# Patient Record
Sex: MALE | Race: ASIAN | Employment: FULL TIME | URBAN - METROPOLITAN AREA
[De-identification: names, ages, dates, MRNs, and addresses within clinical notes are randomized per-mention and may not be internally consistent; named-entity substitution may affect disease eponyms.]

---

## 2018-03-31 ENCOUNTER — HOSPITAL ENCOUNTER (EMERGENCY)
Age: 28
Discharge: HOME | End: 2018-03-31
Attending: EMERGENCY MEDICINE
Payer: COMMERCIAL

## 2018-03-31 VITALS
WEIGHT: 140 LBS | RESPIRATION RATE: 20 BRPM | SYSTOLIC BLOOD PRESSURE: 149 MMHG | BODY MASS INDEX: 22.5 KG/M2 | HEART RATE: 112 BPM | HEIGHT: 66 IN | OXYGEN SATURATION: 99 % | DIASTOLIC BLOOD PRESSURE: 88 MMHG

## 2018-03-31 DIAGNOSIS — R17 JAUNDICE: Primary | ICD-10-CM

## 2018-03-31 PROCEDURE — 36415 COLL VENOUS BLD VENIPUNCTURE: CPT

## 2018-03-31 PROCEDURE — 83690 ASSAY OF LIPASE: CPT | Performed by: EMERGENCY MEDICINE

## 2018-03-31 PROCEDURE — 80074 ACUTE HEPATITIS PANEL: CPT | Performed by: EMERGENCY MEDICINE

## 2018-03-31 PROCEDURE — 85025 COMPLETE CBC W/AUTO DIFF WBC: CPT | Performed by: EMERGENCY MEDICINE

## 2018-03-31 PROCEDURE — 80053 COMPREHEN METABOLIC PANEL: CPT | Performed by: EMERGENCY MEDICINE

## 2018-03-31 PROCEDURE — 86403 PARTICLE AGGLUT ANTBDY SCRN: CPT | Performed by: EMERGENCY MEDICINE

## 2018-03-31 PROCEDURE — 81003 URINALYSIS AUTO W/O SCOPE: CPT | Performed by: EMERGENCY MEDICINE

## 2018-03-31 PROCEDURE — 99283 EMERGENCY DEPT VISIT LOW MDM: CPT

## 2018-04-01 NOTE — ED PROVIDER NOTES
Patient Seen in: [facility] Emergency Department In [place]    History   Patient presents with:  Jaundice (gastrointestinal, hematologic)    Stated Complaint: eyes jaundiced    HPI    Patient noticed jaundiced eyes developing over the past week.   Urine ha Erosive esophagitis, gastritis. : UPPER GI ENDOSCOPY PERFORMED      Comment: H pylori negative        Smoking status: Current Some Day Smoker                                                    Packs/day: 0.00      Years: 0.00         Types: Cigarettes     L components within normal limits   LIPASE - Normal   MONONUCLEOSIS, QUAL - Normal   CBC WITH DIFFERENTIAL WITH PLATELET    Narrative: The following orders were created for panel order CBC WITH DIFFERENTIAL WITH PLATELET.   Procedure

## 2019-05-26 ENCOUNTER — APPOINTMENT (OUTPATIENT)
Dept: CT IMAGING | Facility: HOSPITAL | Age: 29
End: 2019-05-26
Attending: EMERGENCY MEDICINE
Payer: COMMERCIAL

## 2019-05-26 PROCEDURE — 70450 CT HEAD/BRAIN W/O DYE: CPT | Performed by: EMERGENCY MEDICINE

## 2019-05-26 NOTE — ED INITIAL ASSESSMENT (HPI)
Pt arrives after \"over doing drinking and taking benzos\" denies SI/HI.  Pt states he was \"trying to have a good time\"

## 2019-05-26 NOTE — ED NOTES
Food tray ordered - cheeseburger with cheddar, french fries, chicken noodle soup, lemon lime pop, water, and cheesecake

## 2019-05-26 NOTE — ED PROVIDER NOTES
Patient Seen in: [facility] Emergency Department    History   Patient presents with:  Eval-P (psychiatric)    Stated Complaint: Eval P, etoh, took xanax 4-5 pills, fall, abrasion to face    HPI    Bettyjane Dakin is a 64-year-old gentleman presenting to the Comment:  quit 8/27/13    Drug use: No      Review of Systems    Positive for stated complaint: Eval P, etoh, took xanax 4-5 pills, fall, abrasion to face  Other systems are as noted in HPI. Constitutional and vital signs reviewed.       All other systems normal limits   CBC WITH DIFFERENTIAL WITH PLATELET    Narrative: The following orders were created for panel order CBC WITH DIFFERENTIAL WITH PLATELET.   Procedure                               Abnormality         Status                     ---------

## 2019-05-26 NOTE — ED NOTES
PT undressed into hospital gown and socks. All belongings locked and to security. Mom and a second family member at bedside. Pt repeatedly asking for pain meds.  Explained that pt is intoxicated and has possible poly substance use so he cannot receive pain

## 2019-05-27 NOTE — BH LEVEL OF CARE ASSESSMENT
Level of Care Assessment Note    General Questions  Why are you here?: Pt is a 30 yr old male who arrived to the ER via his mom due to drinking and over using his prescription Xanax. Per ER RN note, pt states he was \"trying to have a good time. \" Pt's BAL wished you were dead or wished you could go to sleep and not wake up? (past 30 days): No  2. Have you actually had any thoughts of killing yourself? (past 30 days): No  6.  Have you ever done anything, started to do anything, or prepared to do anything to e Sleep Aids: Per Medication List in EPIC, pt is prescribed Xanax for sleep  Appetite Symptoms: (ness; pt refused assessment)  Unplanned Weight Loss: (ness; pt refused assessment)  Unplanned Weight Gain: (ness; pt refused assessment)  History of Eating Disorder ever been? : (ness; pt refused assessment)                   Support for Recovery  Is your living environment a supportive place for recovery?: (ness; pt refused assessment)  Describe: ness; pt refused assessment     Withdrawal Symptoms  History of Withdrawal laying on hospital bed)  Rate of Movement: Normal  Mood and Affect  Mood or Feelings: Calm  Appropriateness of Affect: Congruent to mood; Appropriate to situation  Range of Affect: Normal  Stability of Affect: Stable  Attitude toward staff: Pleasant  Speech or abuse; Change in treatment  Protective Factors: pt denies SI    Motivational Stage of Change  Motivational Stage of Change: Pre-Contemplative    Level of Care Recommendations  Consulted with: ER MD, Dr. Clive Phelps, and [facility] Charge, Agustin, were notified that

## 2019-05-27 NOTE — ED NOTES
Pt ambulated around c pod nursing station without difficulty. Pt denies pain/weakness/adi/nausea. Mom at bs.

## 2019-05-27 NOTE — ED NOTES
Pt remains resting comfortably; breathing remains unlabored. Pt again reminded urine spec ordered. Urinal handed to pt.  Mom remains at bs,

## 2019-05-28 PROBLEM — F10.20 ALCOHOL USE DISORDER, SEVERE, DEPENDENCE (HCC): Status: ACTIVE | Noted: 2019-05-28

## 2019-07-03 ENCOUNTER — HOSPITAL (OUTPATIENT)
Dept: OTHER | Age: 29
End: 2019-07-03

## 2019-07-08 ENCOUNTER — APPOINTMENT (OUTPATIENT)
Dept: CT IMAGING | Facility: HOSPITAL | Age: 29
End: 2019-07-08
Attending: EMERGENCY MEDICINE
Payer: COMMERCIAL

## 2019-07-08 ENCOUNTER — HOSPITAL ENCOUNTER (EMERGENCY)
Facility: HOSPITAL | Age: 29
Discharge: HOME | End: 2019-07-08
Attending: EMERGENCY MEDICINE
Payer: COMMERCIAL

## 2019-07-08 VITALS
OXYGEN SATURATION: 98 % | RESPIRATION RATE: 18 BRPM | DIASTOLIC BLOOD PRESSURE: 80 MMHG | HEART RATE: 110 BPM | TEMPERATURE: 98 F | SYSTOLIC BLOOD PRESSURE: 113 MMHG

## 2019-07-08 DIAGNOSIS — D72.829 LEUKOCYTOSIS, UNSPECIFIED TYPE: ICD-10-CM

## 2019-07-08 DIAGNOSIS — K52.9 GASTROENTERITIS: Primary | ICD-10-CM

## 2019-07-08 LAB
ALBUMIN SERPL-MCNC: 4.9 G/DL (ref 3.4–5)
ALBUMIN/GLOB SERPL: 0.9 {RATIO} (ref 1–2)
ALP LIVER SERPL-CCNC: 121 U/L (ref 45–117)
ALT SERPL-CCNC: 91 U/L (ref 16–61)
ANION GAP SERPL CALC-SCNC: 8 MMOL/L (ref 0–18)
AST SERPL-CCNC: 53 U/L (ref 15–37)
BASOPHILS # BLD AUTO: 0.04 X10(3) UL (ref 0–0.2)
BASOPHILS NFR BLD AUTO: 0.2 %
BILIRUB SERPL-MCNC: 0.7 MG/DL (ref 0.1–2)
BILIRUB UR QL STRIP.AUTO: NEGATIVE
BUN BLD-MCNC: 19 MG/DL (ref 7–18)
BUN/CREAT SERPL: 12.6 (ref 10–20)
C DIFF TOX B STL QL: NEGATIVE
CALCIUM BLD-MCNC: 10.7 MG/DL (ref 8.5–10.1)
CHLORIDE SERPL-SCNC: 103 MMOL/L (ref 98–112)
CLARITY UR REFRACT.AUTO: CLEAR
CO2 SERPL-SCNC: 24 MMOL/L (ref 21–32)
COLOR UR AUTO: YELLOW
CREAT BLD-MCNC: 1.51 MG/DL (ref 0.7–1.3)
CRYPTOSP AG STL QL IA: NEGATIVE
DEPRECATED RDW RBC AUTO: 41.5 FL (ref 35.1–46.3)
EOSINOPHIL # BLD AUTO: 0.3 X10(3) UL (ref 0–0.7)
EOSINOPHIL NFR BLD AUTO: 1.6 %
ERYTHROCYTE [DISTWIDTH] IN BLOOD BY AUTOMATED COUNT: 14.7 % (ref 11–15)
G LAMBLIA AG STL QL IA: NEGATIVE
GLOBULIN PLAS-MCNC: 5.4 G/DL (ref 2.8–4.4)
GLUCOSE BLD-MCNC: 110 MG/DL (ref 70–99)
GLUCOSE UR STRIP.AUTO-MCNC: NEGATIVE MG/DL
HCT VFR BLD AUTO: 54.9 % (ref 39–53)
HGB BLD-MCNC: 17.8 G/DL (ref 13–17.5)
HYALINE CASTS #/AREA URNS AUTO: PRESENT /LPF
IMM GRANULOCYTES # BLD AUTO: 0.1 X10(3) UL (ref 0–1)
IMM GRANULOCYTES NFR BLD: 0.5 %
KETONES UR STRIP.AUTO-MCNC: NEGATIVE MG/DL
LEUKOCYTE ESTERASE UR QL STRIP.AUTO: NEGATIVE
LIPASE SERPL-CCNC: 174 U/L (ref 73–393)
LIPASE SERPL-CCNC: 180 U/L (ref 73–393)
LYMPHOCYTES # BLD AUTO: 1.54 X10(3) UL (ref 1–4)
LYMPHOCYTES NFR BLD AUTO: 8.1 %
M PROTEIN MFR SERPL ELPH: 10.3 G/DL (ref 6.4–8.2)
MCH RBC QN AUTO: 26.2 PG (ref 26–34)
MCHC RBC AUTO-ENTMCNC: 32.4 G/DL (ref 31–37)
MCV RBC AUTO: 80.7 FL (ref 80–100)
MONOCYTES # BLD AUTO: 1.34 X10(3) UL (ref 0.1–1)
MONOCYTES NFR BLD AUTO: 7.1 %
NEUTROPHILS # BLD AUTO: 15.67 X10 (3) UL (ref 1.5–7.7)
NEUTROPHILS # BLD AUTO: 15.67 X10(3) UL (ref 1.5–7.7)
NEUTROPHILS NFR BLD AUTO: 82.5 %
NITRITE UR QL STRIP.AUTO: NEGATIVE
OSMOLALITY SERPL CALC.SUM OF ELEC: 283 MOSM/KG (ref 275–295)
PH UR STRIP.AUTO: 5 [PH] (ref 4.5–8)
PLATELET # BLD AUTO: 209 10(3)UL (ref 150–450)
POTASSIUM SERPL-SCNC: 4.4 MMOL/L (ref 3.5–5.1)
PROT UR STRIP.AUTO-MCNC: NEGATIVE MG/DL
RBC # BLD AUTO: 6.8 X10(6)UL (ref 4.3–5.7)
SODIUM SERPL-SCNC: 135 MMOL/L (ref 136–145)
SP GR UR STRIP.AUTO: 1.03 (ref 1–1.03)
UROBILINOGEN UR STRIP.AUTO-MCNC: <2 MG/DL
WBC # BLD AUTO: 19 X10(3) UL (ref 4–11)

## 2019-07-08 PROCEDURE — 87329 GIARDIA AG IA: CPT | Performed by: EMERGENCY MEDICINE

## 2019-07-08 PROCEDURE — 87427 SHIGA-LIKE TOXIN AG IA: CPT | Performed by: EMERGENCY MEDICINE

## 2019-07-08 PROCEDURE — 85025 COMPLETE CBC W/AUTO DIFF WBC: CPT | Performed by: EMERGENCY MEDICINE

## 2019-07-08 PROCEDURE — 83690 ASSAY OF LIPASE: CPT | Performed by: EMERGENCY MEDICINE

## 2019-07-08 PROCEDURE — 87272 CRYPTOSPORIDIUM AG IF: CPT | Performed by: EMERGENCY MEDICINE

## 2019-07-08 PROCEDURE — 87209 SMEAR COMPLEX STAIN: CPT | Performed by: EMERGENCY MEDICINE

## 2019-07-08 PROCEDURE — 96361 HYDRATE IV INFUSION ADD-ON: CPT

## 2019-07-08 PROCEDURE — 74177 CT ABD & PELVIS W/CONTRAST: CPT | Performed by: EMERGENCY MEDICINE

## 2019-07-08 PROCEDURE — 87046 STOOL CULTR AEROBIC BACT EA: CPT | Performed by: EMERGENCY MEDICINE

## 2019-07-08 PROCEDURE — 81001 URINALYSIS AUTO W/SCOPE: CPT | Performed by: EMERGENCY MEDICINE

## 2019-07-08 PROCEDURE — 87177 OVA AND PARASITES SMEARS: CPT | Performed by: EMERGENCY MEDICINE

## 2019-07-08 PROCEDURE — 87045 FECES CULTURE AEROBIC BACT: CPT | Performed by: EMERGENCY MEDICINE

## 2019-07-08 PROCEDURE — 87493 C DIFF AMPLIFIED PROBE: CPT | Performed by: EMERGENCY MEDICINE

## 2019-07-08 PROCEDURE — 99285 EMERGENCY DEPT VISIT HI MDM: CPT

## 2019-07-08 PROCEDURE — 96374 THER/PROPH/DIAG INJ IV PUSH: CPT

## 2019-07-08 PROCEDURE — 96376 TX/PRO/DX INJ SAME DRUG ADON: CPT

## 2019-07-08 PROCEDURE — 85025 COMPLETE CBC W/AUTO DIFF WBC: CPT

## 2019-07-08 PROCEDURE — 80053 COMPREHEN METABOLIC PANEL: CPT | Performed by: EMERGENCY MEDICINE

## 2019-07-08 PROCEDURE — 99284 EMERGENCY DEPT VISIT MOD MDM: CPT

## 2019-07-08 PROCEDURE — 82272 OCCULT BLD FECES 1-3 TESTS: CPT | Performed by: EMERGENCY MEDICINE

## 2019-07-08 PROCEDURE — 96375 TX/PRO/DX INJ NEW DRUG ADDON: CPT

## 2019-07-08 RX ORDER — HYDROMORPHONE HYDROCHLORIDE 1 MG/ML
0.5 INJECTION, SOLUTION INTRAMUSCULAR; INTRAVENOUS; SUBCUTANEOUS ONCE
Status: COMPLETED | OUTPATIENT
Start: 2019-07-08 | End: 2019-07-08

## 2019-07-08 RX ORDER — LIDOCAINE HYDROCHLORIDE 20 MG/ML
10 SOLUTION OROPHARYNGEAL ONCE
Status: COMPLETED | OUTPATIENT
Start: 2019-07-08 | End: 2019-07-08

## 2019-07-08 RX ORDER — ESCITALOPRAM OXALATE 20 MG/1
20 TABLET ORAL DAILY
COMMUNITY
End: 2019-10-02

## 2019-07-08 RX ORDER — MAGNESIUM HYDROXIDE/ALUMINUM HYDROXICE/SIMETHICONE 120; 1200; 1200 MG/30ML; MG/30ML; MG/30ML
30 SUSPENSION ORAL ONCE
Status: DISCONTINUED | OUTPATIENT
Start: 2019-07-08 | End: 2019-07-08

## 2019-07-08 RX ORDER — THIAMINE MONONITRATE (VIT B1) 100 MG
100 TABLET ORAL DAILY
COMMUNITY
End: 2019-10-02

## 2019-07-08 RX ORDER — QUETIAPINE 50 MG/1
100 TABLET, FILM COATED ORAL NIGHTLY PRN
COMMUNITY
End: 2019-12-17 | Stop reason: ALTCHOICE

## 2019-07-08 RX ORDER — LORAZEPAM 2 MG/ML
1 INJECTION INTRAMUSCULAR ONCE
Status: COMPLETED | OUTPATIENT
Start: 2019-07-08 | End: 2019-07-08

## 2019-07-08 RX ORDER — CYCLOBENZAPRINE HCL 10 MG
10 TABLET ORAL 3 TIMES DAILY PRN
COMMUNITY
End: 2019-08-14 | Stop reason: ALTCHOICE

## 2019-07-08 RX ORDER — QUETIAPINE 50 MG/1
50 TABLET, FILM COATED ORAL 2 TIMES DAILY
COMMUNITY
End: 2019-12-17 | Stop reason: ALTCHOICE

## 2019-07-08 RX ORDER — ONDANSETRON 2 MG/ML
4 INJECTION INTRAMUSCULAR; INTRAVENOUS ONCE
Status: COMPLETED | OUTPATIENT
Start: 2019-07-08 | End: 2019-07-08

## 2019-07-08 RX ORDER — SODIUM CHLORIDE 9 MG/ML
INJECTION, SOLUTION INTRAVENOUS CONTINUOUS
Status: DISCONTINUED | OUTPATIENT
Start: 2019-07-08 | End: 2019-07-08

## 2019-07-08 RX ORDER — HYDROMORPHONE HYDROCHLORIDE 1 MG/ML
1 INJECTION, SOLUTION INTRAMUSCULAR; INTRAVENOUS; SUBCUTANEOUS ONCE
Status: COMPLETED | OUTPATIENT
Start: 2019-07-08 | End: 2019-07-08

## 2019-07-08 RX ORDER — PANTOPRAZOLE SODIUM 40 MG/1
40 TABLET, DELAYED RELEASE ORAL DAILY
Qty: 30 TABLET | Refills: 0 | Status: SHIPPED | OUTPATIENT
Start: 2019-07-08 | End: 2019-07-31

## 2019-07-08 RX ORDER — MAGNESIUM HYDROXIDE/ALUMINUM HYDROXICE/SIMETHICONE 120; 1200; 1200 MG/30ML; MG/30ML; MG/30ML
30 SUSPENSION ORAL ONCE
Status: COMPLETED | OUTPATIENT
Start: 2019-07-08 | End: 2019-07-08

## 2019-07-08 NOTE — ED INITIAL ASSESSMENT (HPI)
Patient c/o waking up with vomiting and now with severe upper mid-abdominal pain. He states he has diarrhea, vomiting and pain worsening since this morning. Denies fevers or urinary symptoms.

## 2019-07-09 ENCOUNTER — HOSPITAL ENCOUNTER (EMERGENCY)
Facility: HOSPITAL | Age: 29
Discharge: HOME | End: 2019-07-09
Attending: EMERGENCY MEDICINE
Payer: COMMERCIAL

## 2019-07-09 VITALS
WEIGHT: 151.88 LBS | SYSTOLIC BLOOD PRESSURE: 108 MMHG | DIASTOLIC BLOOD PRESSURE: 67 MMHG | HEIGHT: 66 IN | HEART RATE: 108 BPM | RESPIRATION RATE: 16 BRPM | BODY MASS INDEX: 24.41 KG/M2 | OXYGEN SATURATION: 99 % | TEMPERATURE: 97 F

## 2019-07-09 DIAGNOSIS — R19.7 DIARRHEA, UNSPECIFIED TYPE: Primary | ICD-10-CM

## 2019-07-09 LAB
ALBUMIN SERPL-MCNC: 3.9 G/DL (ref 3.4–5)
ALBUMIN/GLOB SERPL: 0.8 {RATIO} (ref 1–2)
ALP LIVER SERPL-CCNC: 88 U/L (ref 45–117)
ALT SERPL-CCNC: 65 U/L (ref 16–61)
ANION GAP SERPL CALC-SCNC: 6 MMOL/L (ref 0–18)
AST SERPL-CCNC: 27 U/L (ref 15–37)
BASOPHILS # BLD AUTO: 0.01 X10(3) UL (ref 0–0.2)
BASOPHILS NFR BLD AUTO: 0.1 %
BILIRUB SERPL-MCNC: 1.1 MG/DL (ref 0.1–2)
BILIRUB UR QL STRIP.AUTO: NEGATIVE
BUN BLD-MCNC: 15 MG/DL (ref 7–18)
BUN/CREAT SERPL: 12.7 (ref 10–20)
CALCIUM BLD-MCNC: 8.5 MG/DL (ref 8.5–10.1)
CHLORIDE SERPL-SCNC: 105 MMOL/L (ref 98–112)
CO2 SERPL-SCNC: 24 MMOL/L (ref 21–32)
CREAT BLD-MCNC: 1.18 MG/DL (ref 0.7–1.3)
CRYPTOSP AG STL QL IA: NEGATIVE
DEPRECATED RDW RBC AUTO: 42 FL (ref 35.1–46.3)
EOSINOPHIL # BLD AUTO: 0.13 X10(3) UL (ref 0–0.7)
EOSINOPHIL NFR BLD AUTO: 1.5 %
ERYTHROCYTE [DISTWIDTH] IN BLOOD BY AUTOMATED COUNT: 14.3 % (ref 11–15)
G LAMBLIA AG STL QL IA: NEGATIVE
GLOBULIN PLAS-MCNC: 4.9 G/DL (ref 2.8–4.4)
GLUCOSE BLD-MCNC: 97 MG/DL (ref 70–99)
GLUCOSE UR STRIP.AUTO-MCNC: NEGATIVE MG/DL
HCT VFR BLD AUTO: 48 % (ref 39–53)
HGB BLD-MCNC: 15.6 G/DL (ref 13–17.5)
IMM GRANULOCYTES # BLD AUTO: 0.03 X10(3) UL (ref 0–1)
IMM GRANULOCYTES NFR BLD: 0.3 %
KETONES UR STRIP.AUTO-MCNC: NEGATIVE MG/DL
LEUKOCYTE ESTERASE UR QL STRIP.AUTO: NEGATIVE
LIPASE SERPL-CCNC: 164 U/L (ref 73–393)
LYMPHOCYTES # BLD AUTO: 2.05 X10(3) UL (ref 1–4)
LYMPHOCYTES NFR BLD AUTO: 23.9 %
M PROTEIN MFR SERPL ELPH: 8.8 G/DL (ref 6.4–8.2)
MCH RBC QN AUTO: 26.3 PG (ref 26–34)
MCHC RBC AUTO-ENTMCNC: 32.5 G/DL (ref 31–37)
MCV RBC AUTO: 80.9 FL (ref 80–100)
MONOCYTES # BLD AUTO: 1.01 X10(3) UL (ref 0.1–1)
MONOCYTES NFR BLD AUTO: 11.8 %
NEUTROPHILS # BLD AUTO: 5.35 X10 (3) UL (ref 1.5–7.7)
NEUTROPHILS # BLD AUTO: 5.35 X10(3) UL (ref 1.5–7.7)
NEUTROPHILS NFR BLD AUTO: 62.4 %
NITRITE UR QL STRIP.AUTO: NEGATIVE
OSMOLALITY SERPL CALC.SUM OF ELEC: 281 MOSM/KG (ref 275–295)
PH UR STRIP.AUTO: 5 [PH] (ref 4.5–8)
PLATELET # BLD AUTO: 182 10(3)UL (ref 150–450)
POTASSIUM SERPL-SCNC: 3.5 MMOL/L (ref 3.5–5.1)
PROT UR STRIP.AUTO-MCNC: NEGATIVE MG/DL
RBC # BLD AUTO: 5.93 X10(6)UL (ref 4.3–5.7)
RBC UR QL AUTO: NEGATIVE
SODIUM SERPL-SCNC: 135 MMOL/L (ref 136–145)
SP GR UR STRIP.AUTO: 1.03 (ref 1–1.03)
UROBILINOGEN UR STRIP.AUTO-MCNC: <2 MG/DL
WBC # BLD AUTO: 8.6 X10(3) UL (ref 4–11)

## 2019-07-09 PROCEDURE — 87046 STOOL CULTR AEROBIC BACT EA: CPT | Performed by: EMERGENCY MEDICINE

## 2019-07-09 PROCEDURE — 99284 EMERGENCY DEPT VISIT MOD MDM: CPT

## 2019-07-09 PROCEDURE — 96361 HYDRATE IV INFUSION ADD-ON: CPT

## 2019-07-09 PROCEDURE — 87427 SHIGA-LIKE TOXIN AG IA: CPT | Performed by: EMERGENCY MEDICINE

## 2019-07-09 PROCEDURE — 87329 GIARDIA AG IA: CPT | Performed by: EMERGENCY MEDICINE

## 2019-07-09 PROCEDURE — 81003 URINALYSIS AUTO W/O SCOPE: CPT | Performed by: EMERGENCY MEDICINE

## 2019-07-09 PROCEDURE — 96374 THER/PROPH/DIAG INJ IV PUSH: CPT

## 2019-07-09 PROCEDURE — 87272 CRYPTOSPORIDIUM AG IF: CPT | Performed by: EMERGENCY MEDICINE

## 2019-07-09 PROCEDURE — 87045 FECES CULTURE AEROBIC BACT: CPT | Performed by: EMERGENCY MEDICINE

## 2019-07-09 PROCEDURE — 82272 OCCULT BLD FECES 1-3 TESTS: CPT | Performed by: EMERGENCY MEDICINE

## 2019-07-09 PROCEDURE — 83690 ASSAY OF LIPASE: CPT | Performed by: EMERGENCY MEDICINE

## 2019-07-09 PROCEDURE — 96375 TX/PRO/DX INJ NEW DRUG ADDON: CPT

## 2019-07-09 PROCEDURE — 87209 SMEAR COMPLEX STAIN: CPT | Performed by: EMERGENCY MEDICINE

## 2019-07-09 PROCEDURE — 87177 OVA AND PARASITES SMEARS: CPT | Performed by: EMERGENCY MEDICINE

## 2019-07-09 PROCEDURE — 96376 TX/PRO/DX INJ SAME DRUG ADON: CPT

## 2019-07-09 PROCEDURE — 85025 COMPLETE CBC W/AUTO DIFF WBC: CPT | Performed by: EMERGENCY MEDICINE

## 2019-07-09 PROCEDURE — 80053 COMPREHEN METABOLIC PANEL: CPT | Performed by: EMERGENCY MEDICINE

## 2019-07-09 RX ORDER — ONDANSETRON 4 MG/1
4 TABLET, ORALLY DISINTEGRATING ORAL EVERY 4 HOURS PRN
Qty: 10 TABLET | Refills: 0 | Status: SHIPPED | OUTPATIENT
Start: 2019-07-09 | End: 2019-07-16

## 2019-07-09 RX ORDER — METOCLOPRAMIDE HYDROCHLORIDE 5 MG/ML
5 INJECTION INTRAMUSCULAR; INTRAVENOUS ONCE
Status: COMPLETED | OUTPATIENT
Start: 2019-07-09 | End: 2019-07-09

## 2019-07-09 RX ORDER — MORPHINE SULFATE 4 MG/ML
2 INJECTION, SOLUTION INTRAMUSCULAR; INTRAVENOUS ONCE
Status: COMPLETED | OUTPATIENT
Start: 2019-07-09 | End: 2019-07-09

## 2019-07-09 RX ORDER — ONDANSETRON 2 MG/ML
4 INJECTION INTRAMUSCULAR; INTRAVENOUS ONCE
Status: COMPLETED | OUTPATIENT
Start: 2019-07-09 | End: 2019-07-09

## 2019-07-09 RX ORDER — HYOSCYAMINE SULFATE 0.125 MG
125 TABLET ORAL EVERY 4 HOURS PRN
Qty: 15 TABLET | Refills: 0 | Status: SHIPPED | OUTPATIENT
Start: 2019-07-09 | End: 2019-07-31

## 2019-07-09 NOTE — ED INITIAL ASSESSMENT (HPI)
Pt to ED with c/o diarrhea x 12 in 12 hours and mid/lower abd pain. Pt was seen for same last night in ED.

## 2019-07-09 NOTE — ED NOTES
Patient remains in NAD. Aware he is not to drive 6 hours after morphine was given, verbalized understanding. RN witnessed patient calling for his ride home, reports he did not drive here.   Ambulatory out of ER in NAD

## 2019-07-09 NOTE — ED PROVIDER NOTES
Patient Seen in: [facility] Emergency Department    History   Patient presents with:  Abdomen/Flank Pain (GI/)  Nausea/Vomiting/Diarrhea (gastrointestinal)    Stated Complaint: abd pain nvd    HPI    18-year-old male presents to the emergency depa ENDOSCOPY PERFORMED  12/12= Erosive esophagitis, gastritis.     H pylori negative           Social History    Tobacco Use      Smoking status: Current Every Day Smoker        Packs/day: 0.50        Years: 13.00        Pack years: 6.5        Types: Cigarette components within normal limits   URINALYSIS WITH CULTURE REFLEX - Abnormal; Notable for the following components:    Spec Gravity 1.034 (*)     Blood Urine Small (*)     Bacteria Urine Rare (*)     Ca Oxalate Crystals Rare (*)     Hyaline Casts Present [place] Kalpesh Please view results for these tests on the individual orders. OVA AND PARASITE W GIARDIA EIA    Narrative: The following orders were created for panel order OVA AND PARASITE W GIARDIA EIA.   Procedure                               Abnormal hydronephrosis. No obstructing urolithiasis. BOWEL/MESENTERY:  Bowel is normal in caliber. Colonic diverticulosis     without evidence of diverticulitis. Fluid-filled colon without evidence     of obstruction which may represent diarrheal state.   The emergency department if any symptoms should worsen as work-up other than the leukocytosis is fairly benign which could be a stress response to multiple episodes of vomiting and diarrhea.             Disposition and Plan     Clinical Impression:  Genna Tabor

## 2019-07-09 NOTE — ED PROVIDER NOTES
Patient Seen in: [facility] Emergency Department    History   Patient presents with:  Nausea/Vomiting/Diarrhea (gastrointestinal)    Stated Complaint: diarrhea seen last night    HPI    29-year-old male who presents the emergency room today for comp UPPER GI ENDOSCOPY PERFORMED  12/12= Erosive esophagitis, gastritis.     H pylori negative           Social History    Tobacco Use      Smoking status: Current Every Day Smoker        Packs/day: 0.50        Years: 13.00        Pack years: 6.5        Types: URINALYSIS WITH CULTURE REFLEX - Abnormal; Notable for the following components:       Result Value    Urine Color Jessica (*)     Clarity Urine Hazy (*)     All other components within normal limits   COMP METABOLIC PANEL (14) - Abnormal; Notable for the orders were created for panel order OVA AND PARASITE W GIARDIA EIA.   Procedure                               Abnormality         Status                     ---------                               -----------         ------                     OVA AND [place]RA[place] R-0    Hyoscyamine Sulfate (LEVSIN) 0.125 MG Oral Tab  Take 1 tablet (125 mcg total) by mouth every 4 (four) hours as needed for Cramping., Normal, Disp-15 tablet, R-0

## 2019-07-09 NOTE — ED NOTES
Pt given water for PO challenge. Pt states he is [facility] wild pain\" after PO intake. ED MD aware.

## 2019-07-11 LAB
OVA AND PARASITE, TRICHROME STAIN: NEGATIVE
OVA AND PARASITE, TRICHROME STAIN: NEGATIVE
OVA AND PARASITE, WET MOUNT: NEGATIVE
OVA AND PARASITE, WET MOUNT: NEGATIVE

## 2019-07-30 NOTE — ED INITIAL ASSESSMENT (HPI)
Patient sts drank and took his xanax. \"I basically made a mistake. \" unknown about of ETOH. Patient sts tomorrow he's supposed to go to [facility] for IOP. Patient shay pain around his kidneys left side.

## 2019-07-30 NOTE — ED PROVIDER NOTES
Patient Seen in: [facility] Emergency Department    History   Patient presents with:  Alcohol Intoxication (neurologic)    Stated Complaint: etoh    HPI    Patient is a 28-year-old male presenting to the emergency department due to alcohol intoxicat SURGICAL HISTORY  11-12-12    s/p ralap right pyeloplasty cdh, Dr Marianela Stokes   • UPPER GI ENDOSCOPY PERFORMED  12/12= Erosive esophagitis, gastritis.     H pylori negative           Social History    Tobacco Use      Smoking status: Current Every Day Smoker rhonchi. Abdomen: Normoactive bowel sounds. Soft, nondistended. No HSM or masses. Nontender throughout abdomen to superficial and deep palpation throughout all 4 quadrants, epigastrium and suprapubic regions. No CVA tenderness.   Extremities: No deformity LIGHT GREEN   RAINBOW DRAW GOLD          Blood was obtained peripherally access was established. He was given IV normal saline. He was observed closely in the ED. MDM       Patient has denied any SI or HI.   I provided medical screening exam, I do not

## 2019-08-08 ENCOUNTER — APPOINTMENT (OUTPATIENT)
Dept: CT IMAGING | Facility: HOSPITAL | Age: 29
End: 2019-08-08
Attending: EMERGENCY MEDICINE
Payer: COMMERCIAL

## 2019-08-08 ENCOUNTER — HOSPITAL ENCOUNTER (EMERGENCY)
Facility: HOSPITAL | Age: 29
Discharge: HOME | End: 2019-08-08
Attending: EMERGENCY MEDICINE
Payer: COMMERCIAL

## 2019-08-08 VITALS
TEMPERATURE: 99 F | HEART RATE: 79 BPM | OXYGEN SATURATION: 99 % | RESPIRATION RATE: 18 BRPM | SYSTOLIC BLOOD PRESSURE: 121 MMHG | WEIGHT: 150 LBS | BODY MASS INDEX: 24.11 KG/M2 | HEIGHT: 66 IN | DIASTOLIC BLOOD PRESSURE: 69 MMHG

## 2019-08-08 DIAGNOSIS — S22.080A COMPRESSION FRACTURE OF T11 VERTEBRA, INITIAL ENCOUNTER (HCC): ICD-10-CM

## 2019-08-08 DIAGNOSIS — S22.080A COMPRESSION FRACTURE OF T12 VERTEBRA, INITIAL ENCOUNTER (HCC): ICD-10-CM

## 2019-08-08 DIAGNOSIS — M54.6 BACK PAIN OF THORACOLUMBAR REGION: Primary | ICD-10-CM

## 2019-08-08 DIAGNOSIS — M54.50 BACK PAIN OF THORACOLUMBAR REGION: Primary | ICD-10-CM

## 2019-08-08 LAB
ALBUMIN SERPL-MCNC: 4 G/DL (ref 3.4–5)
ALBUMIN/GLOB SERPL: 0.9 {RATIO} (ref 1–2)
ALP LIVER SERPL-CCNC: 92 U/L (ref 45–117)
ALT SERPL-CCNC: 55 U/L (ref 16–61)
ANION GAP SERPL CALC-SCNC: 7 MMOL/L (ref 0–18)
AST SERPL-CCNC: 56 U/L (ref 15–37)
BASOPHILS # BLD AUTO: 0.03 X10(3) UL (ref 0–0.2)
BASOPHILS NFR BLD AUTO: 0.3 %
BILIRUB SERPL-MCNC: 1.5 MG/DL (ref 0.1–2)
BUN BLD-MCNC: 15 MG/DL (ref 7–18)
BUN/CREAT SERPL: 12.6 (ref 10–20)
CALCIUM BLD-MCNC: 9.2 MG/DL (ref 8.5–10.1)
CHLORIDE SERPL-SCNC: 104 MMOL/L (ref 98–112)
CO2 SERPL-SCNC: 24 MMOL/L (ref 21–32)
CREAT BLD-MCNC: 1.19 MG/DL (ref 0.7–1.3)
DEPRECATED RDW RBC AUTO: 41.2 FL (ref 35.1–46.3)
EOSINOPHIL # BLD AUTO: 0.25 X10(3) UL (ref 0–0.7)
EOSINOPHIL NFR BLD AUTO: 2.5 %
ERYTHROCYTE [DISTWIDTH] IN BLOOD BY AUTOMATED COUNT: 15.4 % (ref 11–15)
ETHANOL SERPL-MCNC: <3 MG/DL (ref ?–3)
GLOBULIN PLAS-MCNC: 4.5 G/DL (ref 2.8–4.4)
GLUCOSE BLD-MCNC: 72 MG/DL (ref 70–99)
HAV IGM SER QL: 2.3 MG/DL (ref 1.6–2.6)
HCT VFR BLD AUTO: 44.3 % (ref 39–53)
HGB BLD-MCNC: 14.7 G/DL (ref 13–17.5)
IMM GRANULOCYTES # BLD AUTO: 0.08 X10(3) UL (ref 0–1)
IMM GRANULOCYTES NFR BLD: 0.8 %
LYMPHOCYTES # BLD AUTO: 2.1 X10(3) UL (ref 1–4)
LYMPHOCYTES NFR BLD AUTO: 20.6 %
M PROTEIN MFR SERPL ELPH: 8.5 G/DL (ref 6.4–8.2)
MCH RBC QN AUTO: 26.3 PG (ref 26–34)
MCHC RBC AUTO-ENTMCNC: 33.2 G/DL (ref 31–37)
MCV RBC AUTO: 79.1 FL (ref 80–100)
MONOCYTES # BLD AUTO: 0.93 X10(3) UL (ref 0.1–1)
MONOCYTES NFR BLD AUTO: 9.1 %
NEUTROPHILS # BLD AUTO: 6.8 X10 (3) UL (ref 1.5–7.7)
NEUTROPHILS # BLD AUTO: 6.8 X10(3) UL (ref 1.5–7.7)
NEUTROPHILS NFR BLD AUTO: 66.7 %
OSMOLALITY SERPL CALC.SUM OF ELEC: 279 MOSM/KG (ref 275–295)
PLATELET # BLD AUTO: 146 10(3)UL (ref 150–450)
POTASSIUM SERPL-SCNC: 3.3 MMOL/L (ref 3.5–5.1)
RBC # BLD AUTO: 5.6 X10(6)UL (ref 4.3–5.7)
SODIUM SERPL-SCNC: 135 MMOL/L (ref 136–145)
WBC # BLD AUTO: 10.2 X10(3) UL (ref 4–11)

## 2019-08-08 PROCEDURE — 80320 DRUG SCREEN QUANTALCOHOLS: CPT | Performed by: EMERGENCY MEDICINE

## 2019-08-08 PROCEDURE — 96376 TX/PRO/DX INJ SAME DRUG ADON: CPT

## 2019-08-08 PROCEDURE — 96374 THER/PROPH/DIAG INJ IV PUSH: CPT

## 2019-08-08 PROCEDURE — 80053 COMPREHEN METABOLIC PANEL: CPT | Performed by: EMERGENCY MEDICINE

## 2019-08-08 PROCEDURE — 74176 CT ABD & PELVIS W/O CONTRAST: CPT | Performed by: EMERGENCY MEDICINE

## 2019-08-08 PROCEDURE — 96375 TX/PRO/DX INJ NEW DRUG ADDON: CPT

## 2019-08-08 PROCEDURE — 99284 EMERGENCY DEPT VISIT MOD MDM: CPT

## 2019-08-08 PROCEDURE — 83735 ASSAY OF MAGNESIUM: CPT | Performed by: EMERGENCY MEDICINE

## 2019-08-08 PROCEDURE — 96361 HYDRATE IV INFUSION ADD-ON: CPT

## 2019-08-08 PROCEDURE — 99285 EMERGENCY DEPT VISIT HI MDM: CPT

## 2019-08-08 PROCEDURE — 85025 COMPLETE CBC W/AUTO DIFF WBC: CPT | Performed by: EMERGENCY MEDICINE

## 2019-08-08 RX ORDER — IBUPROFEN 600 MG/1
600 TABLET ORAL EVERY 6 HOURS PRN
Qty: 30 TABLET | Refills: 0 | Status: SHIPPED | OUTPATIENT
Start: 2019-08-08 | End: 2019-10-02 | Stop reason: ALTCHOICE

## 2019-08-08 RX ORDER — MORPHINE SULFATE 4 MG/ML
INJECTION, SOLUTION INTRAMUSCULAR; INTRAVENOUS
Status: DISCONTINUED
Start: 2019-08-08 | End: 2019-08-08

## 2019-08-08 RX ORDER — DIAZEPAM 5 MG/ML
5 INJECTION, SOLUTION INTRAMUSCULAR; INTRAVENOUS ONCE
Status: COMPLETED | OUTPATIENT
Start: 2019-08-08 | End: 2019-08-08

## 2019-08-08 RX ORDER — KETOROLAC TROMETHAMINE 30 MG/ML
15 INJECTION, SOLUTION INTRAMUSCULAR; INTRAVENOUS ONCE
Status: COMPLETED | OUTPATIENT
Start: 2019-08-08 | End: 2019-08-08

## 2019-08-08 RX ORDER — MORPHINE SULFATE 4 MG/ML
4 INJECTION, SOLUTION INTRAMUSCULAR; INTRAVENOUS ONCE
Status: COMPLETED | OUTPATIENT
Start: 2019-08-08 | End: 2019-08-08

## 2019-08-08 RX ORDER — HYDROCODONE BITARTRATE AND ACETAMINOPHEN 5; 325 MG/1; MG/1
1 TABLET ORAL EVERY 4 HOURS PRN
Qty: 15 TABLET | Refills: 0 | Status: SHIPPED | OUTPATIENT
Start: 2019-08-08 | End: 2019-08-18

## 2019-08-08 NOTE — ED INITIAL ASSESSMENT (HPI)
Pt here for lower mid back pain that occurred after getting up from laying down last night. Pt denies any injury to area. Pt denies loss of bowel or bladder. Pt denies any numbness or weakness down legs. Pt states can not fully stand.

## 2019-08-12 ENCOUNTER — HOSPITAL ENCOUNTER (EMERGENCY)
Age: 29
Discharge: HOME | End: 2019-08-12
Attending: EMERGENCY MEDICINE
Payer: COMMERCIAL

## 2019-08-12 VITALS
TEMPERATURE: 97 F | DIASTOLIC BLOOD PRESSURE: 75 MMHG | HEART RATE: 83 BPM | BODY MASS INDEX: 24.11 KG/M2 | HEIGHT: 66 IN | SYSTOLIC BLOOD PRESSURE: 113 MMHG | RESPIRATION RATE: 20 BRPM | WEIGHT: 150 LBS | OXYGEN SATURATION: 98 %

## 2019-08-12 DIAGNOSIS — S22.080D COMPRESSION FRACTURE OF T12 VERTEBRA WITH ROUTINE HEALING, SUBSEQUENT ENCOUNTER: Primary | ICD-10-CM

## 2019-08-12 PROCEDURE — 99283 EMERGENCY DEPT VISIT LOW MDM: CPT

## 2019-08-12 RX ORDER — HYDROCODONE BITARTRATE AND ACETAMINOPHEN 5; 325 MG/1; MG/1
1 TABLET ORAL ONCE
Status: COMPLETED | OUTPATIENT
Start: 2019-08-12 | End: 2019-08-12

## 2019-08-12 RX ORDER — HYDROCODONE BITARTRATE AND ACETAMINOPHEN 5; 325 MG/1; MG/1
1-2 TABLET ORAL EVERY 6 HOURS PRN
Qty: 10 TABLET | Refills: 0 | Status: SHIPPED | OUTPATIENT
Start: 2019-08-12 | End: 2019-08-19

## 2019-08-12 NOTE — ED PROVIDER NOTES
Patient Seen in: [facility] Emergency Department In [place]    History   Patient presents with:  Pain (neurologic)    Stated Complaint: t11-t12 known fracture- wearing brace.  ran out of pain meds yesterday    HPI    25-year-old male who presents to the [facility] Packs/day: 0.50        Years: 13.00        Pack years: 6.5        Types: Cigarettes      Smokeless tobacco: Current User      Tobacco comment: vaping    Alcohol use: Yes      Comment: pt reports drinking 1/2 of a fifth of vodka for the past 2-3 months    D diagnosis)    Disposition:  Discharge  8/12/2019 11:29 am    Follow-up:  Yasmany Marie [address]  [address] [facility]  [phone]    Schedule an appointment as soon as possible for a visit in 2 days          Medications Prescribed:  Curr

## 2019-08-12 NOTE — ED INITIAL ASSESSMENT (HPI)
Seen at [facility] ER early Thursday morning-- dx with T11-T12 compression fracture-- was fitting for brace in ER-- has appt with specialist on Weds-- ran out of pain medication yesterday

## 2019-08-16 PROBLEM — S22.000A COMPRESSION FRACTURE OF BODY OF THORACIC VERTEBRA (HCC): Status: ACTIVE | Noted: 2019-08-16

## 2019-08-20 PROBLEM — M80.80XA OTHER OSTEOPOROSIS WITH CURRENT PATHOLOGICAL FRACTURE, INITIAL ENCOUNTER: Status: ACTIVE | Noted: 2019-08-20

## 2019-09-11 PROCEDURE — 84403 ASSAY OF TOTAL TESTOSTERONE: CPT | Performed by: PHYSICIAN ASSISTANT

## 2019-09-11 PROCEDURE — 84402 ASSAY OF FREE TESTOSTERONE: CPT | Performed by: PHYSICIAN ASSISTANT

## 2019-09-11 PROCEDURE — 86256 FLUORESCENT ANTIBODY TITER: CPT | Performed by: PHYSICIAN ASSISTANT

## 2019-09-11 PROCEDURE — 82784 ASSAY IGA/IGD/IGG/IGM EACH: CPT | Performed by: PHYSICIAN ASSISTANT

## 2019-10-02 RX ORDER — ACETAMINOPHEN 500 MG
1000 TABLET ORAL ONCE
Status: CANCELLED | OUTPATIENT
Start: 2019-10-02 | End: 2019-10-02

## 2019-10-03 ENCOUNTER — LABORATORY ENCOUNTER (OUTPATIENT)
Dept: LAB | Facility: HOSPITAL | Age: 29
End: 2019-10-03
Attending: ORTHOPAEDIC SURGERY
Payer: COMMERCIAL

## 2019-10-03 DIAGNOSIS — M40.204 KYPHOSIS OF THORACIC REGION, UNSPECIFIED KYPHOSIS TYPE: ICD-10-CM

## 2019-10-03 DIAGNOSIS — S22.000A COMPRESSION FRACTURE OF BODY OF THORACIC VERTEBRA (HCC): ICD-10-CM

## 2019-10-03 PROCEDURE — 93005 ELECTROCARDIOGRAM TRACING: CPT

## 2019-10-03 PROCEDURE — 87081 CULTURE SCREEN ONLY: CPT

## 2019-10-03 PROCEDURE — 86900 BLOOD TYPING SEROLOGIC ABO: CPT

## 2019-10-03 PROCEDURE — 87086 URINE CULTURE/COLONY COUNT: CPT

## 2019-10-03 PROCEDURE — 86850 RBC ANTIBODY SCREEN: CPT

## 2019-10-03 PROCEDURE — 93010 ELECTROCARDIOGRAM REPORT: CPT | Performed by: INTERNAL MEDICINE

## 2019-10-03 PROCEDURE — 85025 COMPLETE CBC W/AUTO DIFF WBC: CPT

## 2019-10-03 PROCEDURE — 86901 BLOOD TYPING SEROLOGIC RH(D): CPT

## 2019-10-03 PROCEDURE — 80053 COMPREHEN METABOLIC PANEL: CPT

## 2019-10-03 PROCEDURE — 85610 PROTHROMBIN TIME: CPT

## 2019-10-03 PROCEDURE — 85730 THROMBOPLASTIN TIME PARTIAL: CPT

## 2019-10-03 PROCEDURE — 36415 COLL VENOUS BLD VENIPUNCTURE: CPT

## 2019-10-14 ENCOUNTER — ANESTHESIA EVENT (OUTPATIENT)
Dept: SURGERY | Facility: HOSPITAL | Age: 29
End: 2019-10-14

## 2019-10-15 ENCOUNTER — HOSPITAL ENCOUNTER (INPATIENT)
Facility: HOSPITAL | Age: 29
LOS: 5 days | Discharge: HOME HEALTH | DRG: 455 | End: 2019-10-20
Attending: ORTHOPAEDIC SURGERY | Admitting: INTERNAL MEDICINE
Payer: COMMERCIAL

## 2019-10-15 ENCOUNTER — APPOINTMENT (OUTPATIENT)
Dept: GENERAL RADIOLOGY | Facility: HOSPITAL | Age: 29
DRG: 455 | End: 2019-10-15
Attending: ORTHOPAEDIC SURGERY
Payer: COMMERCIAL

## 2019-10-15 ENCOUNTER — ANESTHESIA (OUTPATIENT)
Dept: SURGERY | Facility: HOSPITAL | Age: 29
End: 2019-10-15

## 2019-10-15 DIAGNOSIS — M40.204 KYPHOSIS OF THORACIC REGION, UNSPECIFIED KYPHOSIS TYPE: Primary | ICD-10-CM

## 2019-10-15 DIAGNOSIS — S22.000A COMPRESSION FRACTURE OF BODY OF THORACIC VERTEBRA (HCC): ICD-10-CM

## 2019-10-15 PROCEDURE — 30230N0 TRANSFUSION OF AUTOLOGOUS RED BLOOD CELLS INTO PERIPHERAL VEIN, OPEN APPROACH: ICD-10-PCS | Performed by: ORTHOPAEDIC SURGERY

## 2019-10-15 PROCEDURE — 0RG60AJ FUSION OF THORACIC VERTEBRAL JOINT WITH INTERBODY FUSION DEVICE, POSTERIOR APPROACH, ANTERIOR COLUMN, OPEN APPROACH: ICD-10-PCS | Performed by: ORTHOPAEDIC SURGERY

## 2019-10-15 PROCEDURE — 0QS03ZZ REPOSITION LUMBAR VERTEBRA, PERCUTANEOUS APPROACH: ICD-10-PCS | Performed by: ORTHOPAEDIC SURGERY

## 2019-10-15 PROCEDURE — 0PS43ZZ REPOSITION THORACIC VERTEBRA, PERCUTANEOUS APPROACH: ICD-10-PCS | Performed by: ORTHOPAEDIC SURGERY

## 2019-10-15 PROCEDURE — 99291 CRITICAL CARE FIRST HOUR: CPT | Performed by: NURSE PRACTITIONER

## 2019-10-15 PROCEDURE — 0RGA071 FUSION OF THORACOLUMBAR VERTEBRAL JOINT WITH AUTOLOGOUS TISSUE SUBSTITUTE, POSTERIOR APPROACH, POSTERIOR COLUMN, OPEN APPROACH: ICD-10-PCS | Performed by: ORTHOPAEDIC SURGERY

## 2019-10-15 PROCEDURE — 0QU03JZ SUPPLEMENT LUMBAR VERTEBRA WITH SYNTHETIC SUBSTITUTE, PERCUTANEOUS APPROACH: ICD-10-PCS | Performed by: ORTHOPAEDIC SURGERY

## 2019-10-15 PROCEDURE — 0SG0071 FUSION OF LUMBAR VERTEBRAL JOINT WITH AUTOLOGOUS TISSUE SUBSTITUTE, POSTERIOR APPROACH, POSTERIOR COLUMN, OPEN APPROACH: ICD-10-PCS | Performed by: ORTHOPAEDIC SURGERY

## 2019-10-15 PROCEDURE — 0RB90ZZ EXCISION OF THORACIC VERTEBRAL DISC, OPEN APPROACH: ICD-10-PCS | Performed by: ORTHOPAEDIC SURGERY

## 2019-10-15 PROCEDURE — 0RG7071 FUSION OF 2 TO 7 THORACIC VERTEBRAL JOINTS WITH AUTOLOGOUS TISSUE SUBSTITUTE, POSTERIOR APPROACH, POSTERIOR COLUMN, OPEN APPROACH: ICD-10-PCS | Performed by: ORTHOPAEDIC SURGERY

## 2019-10-15 PROCEDURE — 76000 FLUOROSCOPY <1 HR PHYS/QHP: CPT | Performed by: ORTHOPAEDIC SURGERY

## 2019-10-15 PROCEDURE — 0PU43JZ SUPPLEMENT THORACIC VERTEBRA WITH SYNTHETIC SUBSTITUTE, PERCUTANEOUS APPROACH: ICD-10-PCS | Performed by: ORTHOPAEDIC SURGERY

## 2019-10-15 PROCEDURE — 00NX0ZZ RELEASE THORACIC SPINAL CORD, OPEN APPROACH: ICD-10-PCS | Performed by: ORTHOPAEDIC SURGERY

## 2019-10-15 PROCEDURE — 4A11X4G MONITORING OF PERIPHERAL NERVOUS ELECTRICAL ACTIVITY, INTRAOPERATIVE, EXTERNAL APPROACH: ICD-10-PCS | Performed by: ORTHOPAEDIC SURGERY

## 2019-10-15 DEVICE — IMPLANTABLE DEVICE: Type: IMPLANTABLE DEVICE | Site: BACK | Status: FUNCTIONAL

## 2019-10-15 DEVICE — DURASEAL® EXACT SPINAL SEALANT SYSTEM 5ML 5 PACK
Type: IMPLANTABLE DEVICE | Site: BACK | Status: FUNCTIONAL
Brand: DURASEAL EXACT SPINAL SEALANT SYSTEM

## 2019-10-15 RX ORDER — ERGOCALCIFEROL 1.25 MG/1
50000 CAPSULE ORAL
COMMUNITY
End: 2020-05-01

## 2019-10-15 RX ORDER — MIDAZOLAM HYDROCHLORIDE 1 MG/ML
1 INJECTION INTRAMUSCULAR; INTRAVENOUS EVERY 5 MIN PRN
Status: COMPLETED | OUTPATIENT
Start: 2019-10-15 | End: 2019-10-15

## 2019-10-15 RX ORDER — CEFAZOLIN SODIUM/WATER 2 G/20 ML
2 SYRINGE (ML) INTRAVENOUS EVERY 8 HOURS
Status: COMPLETED | OUTPATIENT
Start: 2019-10-16 | End: 2019-10-16

## 2019-10-15 RX ORDER — HYDROMORPHONE HYDROCHLORIDE 1 MG/ML
INJECTION, SOLUTION INTRAMUSCULAR; INTRAVENOUS; SUBCUTANEOUS
Status: COMPLETED
Start: 2019-10-15 | End: 2019-10-15

## 2019-10-15 RX ORDER — SENNOSIDES 8.6 MG
17.2 TABLET ORAL NIGHTLY
Status: DISCONTINUED | OUTPATIENT
Start: 2019-10-15 | End: 2019-10-20

## 2019-10-15 RX ORDER — QUETIAPINE 25 MG/1
50 TABLET, FILM COATED ORAL 2 TIMES DAILY
Status: DISCONTINUED | OUTPATIENT
Start: 2019-10-15 | End: 2019-10-16

## 2019-10-15 RX ORDER — MORPHINE SULFATE 15 MG/1
15 TABLET ORAL EVERY 6 HOURS
Status: DISCONTINUED | OUTPATIENT
Start: 2019-10-15 | End: 2019-10-16

## 2019-10-15 RX ORDER — MORPHINE SULFATE 4 MG/ML
2 INJECTION, SOLUTION INTRAMUSCULAR; INTRAVENOUS EVERY 2 HOUR PRN
Status: DISCONTINUED | OUTPATIENT
Start: 2019-10-15 | End: 2019-10-16

## 2019-10-15 RX ORDER — DIPHENHYDRAMINE HYDROCHLORIDE 50 MG/ML
12.5 INJECTION INTRAMUSCULAR; INTRAVENOUS AS NEEDED
Status: ACTIVE | OUTPATIENT
Start: 2019-10-15 | End: 2019-10-15

## 2019-10-15 RX ORDER — OXYCODONE HYDROCHLORIDE 5 MG/1
5 TABLET ORAL EVERY 6 HOURS
Status: DISCONTINUED | OUTPATIENT
Start: 2019-10-15 | End: 2019-10-16

## 2019-10-15 RX ORDER — DIPHENHYDRAMINE HYDROCHLORIDE 50 MG/ML
25 INJECTION INTRAMUSCULAR; INTRAVENOUS EVERY 4 HOURS PRN
Status: DISCONTINUED | OUTPATIENT
Start: 2019-10-15 | End: 2019-10-20

## 2019-10-15 RX ORDER — METOCLOPRAMIDE HYDROCHLORIDE 5 MG/ML
10 INJECTION INTRAMUSCULAR; INTRAVENOUS EVERY 6 HOURS PRN
Status: DISCONTINUED | OUTPATIENT
Start: 2019-10-15 | End: 2019-10-20

## 2019-10-15 RX ORDER — PREGABALIN 50 MG/1
50 CAPSULE ORAL 2 TIMES DAILY
Status: DISCONTINUED | OUTPATIENT
Start: 2019-10-15 | End: 2019-10-16

## 2019-10-15 RX ORDER — ALPRAZOLAM 0.5 MG/1
0.5 TABLET ORAL 4 TIMES DAILY PRN
Status: DISCONTINUED | OUTPATIENT
Start: 2019-10-15 | End: 2019-10-20

## 2019-10-15 RX ORDER — DIPHENHYDRAMINE HCL 25 MG
25 CAPSULE ORAL EVERY 4 HOURS PRN
Status: DISCONTINUED | OUTPATIENT
Start: 2019-10-15 | End: 2019-10-20

## 2019-10-15 RX ORDER — ONDANSETRON 2 MG/ML
4 INJECTION INTRAMUSCULAR; INTRAVENOUS EVERY 4 HOURS PRN
Status: ACTIVE | OUTPATIENT
Start: 2019-10-15 | End: 2019-10-16

## 2019-10-15 RX ORDER — VANCOMYCIN HYDROCHLORIDE 1 G/20ML
INJECTION, POWDER, LYOPHILIZED, FOR SOLUTION INTRAVENOUS AS NEEDED
Status: DISCONTINUED | OUTPATIENT
Start: 2019-10-15 | End: 2019-10-15 | Stop reason: HOSPADM

## 2019-10-15 RX ORDER — CEFAZOLIN SODIUM 1 G/3ML
INJECTION, POWDER, FOR SOLUTION INTRAMUSCULAR; INTRAVENOUS
Status: DISCONTINUED | OUTPATIENT
Start: 2019-10-15 | End: 2019-10-15 | Stop reason: HOSPADM

## 2019-10-15 RX ORDER — TIZANIDINE 4 MG/1
4 TABLET ORAL 3 TIMES DAILY PRN
Status: DISCONTINUED | OUTPATIENT
Start: 2019-10-15 | End: 2019-10-17

## 2019-10-15 RX ORDER — DOCUSATE SODIUM 100 MG/1
100 CAPSULE, LIQUID FILLED ORAL 2 TIMES DAILY
Status: DISCONTINUED | OUTPATIENT
Start: 2019-10-15 | End: 2019-10-20

## 2019-10-15 RX ORDER — HYDROCODONE BITARTRATE AND ACETAMINOPHEN 5; 325 MG/1; MG/1
1 TABLET ORAL AS NEEDED
Status: DISCONTINUED | OUTPATIENT
Start: 2019-10-15 | End: 2019-10-15 | Stop reason: HOSPADM

## 2019-10-15 RX ORDER — LABETALOL HYDROCHLORIDE 5 MG/ML
5 INJECTION, SOLUTION INTRAVENOUS EVERY 5 MIN PRN
Status: ACTIVE | OUTPATIENT
Start: 2019-10-15 | End: 2019-10-15

## 2019-10-15 RX ORDER — CEFAZOLIN SODIUM/WATER 2 G/20 ML
2 SYRINGE (ML) INTRAVENOUS ONCE
Status: COMPLETED | OUTPATIENT
Start: 2019-10-15 | End: 2019-10-15

## 2019-10-15 RX ORDER — ZOLPIDEM TARTRATE 5 MG/1
5 TABLET ORAL NIGHTLY PRN
Status: DISCONTINUED | OUTPATIENT
Start: 2019-10-15 | End: 2019-10-20

## 2019-10-15 RX ORDER — MORPHINE SULFATE 4 MG/ML
1 INJECTION, SOLUTION INTRAMUSCULAR; INTRAVENOUS EVERY 2 HOUR PRN
Status: DISCONTINUED | OUTPATIENT
Start: 2019-10-15 | End: 2019-10-16

## 2019-10-15 RX ORDER — ACETAMINOPHEN 10 MG/ML
INJECTION, SOLUTION INTRAVENOUS
Status: DISCONTINUED | OUTPATIENT
Start: 2019-10-15 | End: 2019-10-15 | Stop reason: HOSPADM

## 2019-10-15 RX ORDER — HYDROMORPHONE HYDROCHLORIDE 1 MG/ML
0.4 INJECTION, SOLUTION INTRAMUSCULAR; INTRAVENOUS; SUBCUTANEOUS EVERY 5 MIN PRN
Status: ACTIVE | OUTPATIENT
Start: 2019-10-15 | End: 2019-10-15

## 2019-10-15 RX ORDER — MORPHINE SULFATE 4 MG/ML
4 INJECTION, SOLUTION INTRAMUSCULAR; INTRAVENOUS EVERY 2 HOUR PRN
Status: DISCONTINUED | OUTPATIENT
Start: 2019-10-15 | End: 2019-10-16

## 2019-10-15 RX ORDER — SODIUM CHLORIDE, SODIUM LACTATE, POTASSIUM CHLORIDE, CALCIUM CHLORIDE 600; 310; 30; 20 MG/100ML; MG/100ML; MG/100ML; MG/100ML
INJECTION, SOLUTION INTRAVENOUS CONTINUOUS
Status: DISCONTINUED | OUTPATIENT
Start: 2019-10-15 | End: 2019-10-20

## 2019-10-15 RX ORDER — ERGOCALCIFEROL 1.25 MG/1
50000 CAPSULE ORAL
Status: DISCONTINUED | OUTPATIENT
Start: 2019-10-17 | End: 2019-10-20

## 2019-10-15 RX ORDER — ONDANSETRON 2 MG/ML
4 INJECTION INTRAMUSCULAR; INTRAVENOUS AS NEEDED
Status: ACTIVE | OUTPATIENT
Start: 2019-10-15 | End: 2019-10-15

## 2019-10-15 RX ORDER — METOCLOPRAMIDE HYDROCHLORIDE 5 MG/ML
10 INJECTION INTRAMUSCULAR; INTRAVENOUS AS NEEDED
Status: ACTIVE | OUTPATIENT
Start: 2019-10-15 | End: 2019-10-15

## 2019-10-15 RX ORDER — DOXEPIN HYDROCHLORIDE 50 MG/1
1 CAPSULE ORAL EVERY MORNING
Status: DISCONTINUED | OUTPATIENT
Start: 2019-10-16 | End: 2019-10-20

## 2019-10-15 RX ORDER — NALOXONE HYDROCHLORIDE 0.4 MG/ML
80 INJECTION, SOLUTION INTRAMUSCULAR; INTRAVENOUS; SUBCUTANEOUS AS NEEDED
Status: ACTIVE | OUTPATIENT
Start: 2019-10-15 | End: 2019-10-15

## 2019-10-15 RX ORDER — SODIUM CHLORIDE, SODIUM LACTATE, POTASSIUM CHLORIDE, CALCIUM CHLORIDE 600; 310; 30; 20 MG/100ML; MG/100ML; MG/100ML; MG/100ML
INJECTION, SOLUTION INTRAVENOUS CONTINUOUS
Status: DISCONTINUED | OUTPATIENT
Start: 2019-10-15 | End: 2019-10-15

## 2019-10-15 RX ORDER — SODIUM PHOSPHATE, DIBASIC AND SODIUM PHOSPHATE, MONOBASIC 7; 19 G/133ML; G/133ML
1 ENEMA RECTAL ONCE AS NEEDED
Status: DISCONTINUED | OUTPATIENT
Start: 2019-10-15 | End: 2019-10-20

## 2019-10-15 RX ORDER — BUPIVACAINE HYDROCHLORIDE 5 MG/ML
INJECTION, SOLUTION EPIDURAL; INTRACAUDAL AS NEEDED
Status: DISCONTINUED | OUTPATIENT
Start: 2019-10-15 | End: 2019-10-15 | Stop reason: HOSPADM

## 2019-10-15 RX ORDER — MIDAZOLAM HYDROCHLORIDE 1 MG/ML
INJECTION INTRAMUSCULAR; INTRAVENOUS
Status: COMPLETED
Start: 2019-10-15 | End: 2019-10-15

## 2019-10-15 RX ORDER — QUETIAPINE 25 MG/1
100 TABLET, FILM COATED ORAL NIGHTLY
Status: DISCONTINUED | OUTPATIENT
Start: 2019-10-15 | End: 2019-10-20

## 2019-10-15 RX ORDER — HYDROCODONE BITARTRATE AND ACETAMINOPHEN 5; 325 MG/1; MG/1
2 TABLET ORAL AS NEEDED
Status: DISCONTINUED | OUTPATIENT
Start: 2019-10-15 | End: 2019-10-15 | Stop reason: HOSPADM

## 2019-10-15 RX ORDER — SODIUM CHLORIDE, SODIUM LACTATE, POTASSIUM CHLORIDE, CALCIUM CHLORIDE 600; 310; 30; 20 MG/100ML; MG/100ML; MG/100ML; MG/100ML
INJECTION, SOLUTION INTRAVENOUS CONTINUOUS
Status: DISCONTINUED | OUTPATIENT
Start: 2019-10-15 | End: 2019-10-15 | Stop reason: HOSPADM

## 2019-10-15 RX ORDER — POLYETHYLENE GLYCOL 3350 17 G/17G
17 POWDER, FOR SOLUTION ORAL DAILY PRN
Status: DISCONTINUED | OUTPATIENT
Start: 2019-10-15 | End: 2019-10-20

## 2019-10-15 RX ORDER — BISACODYL 10 MG
10 SUPPOSITORY, RECTAL RECTAL
Status: DISCONTINUED | OUTPATIENT
Start: 2019-10-15 | End: 2019-10-20

## 2019-10-15 NOTE — H&P
Patient: Laura Crespo Record Number: [id number]  Referring Physician:  Darren Self Referred  PCP: Natalie Vivar MD        HISTORY OF CHIEF COMPLAINT:    Kelly Sylvester is a 30year old male, who comes today to discuss his mid back pain.  Osmany   s/p ralap right pyeloplasty cdh, Dr Ivon Soni   • UPPER GI ENDOSCOPY PERFORMED   12/12= Erosive esophagitis, gastritis.     H pylori negative       Family History   Family history unknown:  Yes       Social History    Socioeconomic History      Marital status QUEtiapine Fumarate 100 MG Oral Tab Take 100 mg by mouth nightly. Disp:  Rfl:    QUEtiapine Fumarate 50 MG Oral Tab Take 50 mg by mouth 2 (two) times daily. Morning and afternoon Disp:  Rfl:    escitalopram 20 MG Oral Tab Take 20 mg by mouth daily.  Disp: IMAGING STUDIES:  Imaging studies reviewed with the patient today  XR:  MRI:  CT:T11, T12 fractures                                                                                     MEDICAL DECISION MAKING:      Plan:   Diagnoses and all orders for this laminectomy risks  We discussed the risks of surgery including, but not limited to, infection, nerve damage, vascular damage, cerebrospinal fluid leak, residual pain, scarring, need for second stage revision, compressive hematoma resulting in neurologic sy

## 2019-10-15 NOTE — ANESTHESIA PREPROCEDURE EVALUATION
PRE-OP EVALUATION    Patient Name: Leeanne Harris    Pre-op Diagnosis: Kyphosis of thoracic region, unspecified kyphosis type [M40.204]  Compression fracture of body of thoracic vertebra ([address] [S22.000A]    Procedure(s):  Thoracic 12 Pedicle Subtraction O PRN  Trimethobenzamide HCl (TIGAN) injection 200 mg, 200 mg, Intramuscular, PRN  Naloxone HCl (NARCAN) 0.4 MG/ML injection 80 mcg, 80 mcg, Intravenous, PRN  diphenhydrAMINE HCl (BENADRYL) injection 12.5 mg, 12.5 mg, Intravenous, PRN        Outpatient Medic anxiety                            Past Surgical History:   Procedure Laterality Date   • ABDOMINAL SCAR REVISION N/A 3/5/2013    Performed by Mike Ronquillo MD at [address] [place] [address] Right 12/18/2012    Performed Component Value Date     10/03/2019    K 3.6 10/03/2019     10/03/2019    CO2 28.0 10/03/2019    BUN 11 10/03/2019    CREATSERUM 1.05 10/03/2019    GLU 76 10/03/2019    CA 9.0 10/03/2019     Lab Results   Component Value Date    INR 0.93 10/0

## 2019-10-15 NOTE — ANESTHESIA POSTPROCEDURE EVALUATION
[facility] Patient Status:  Surgery Admit - Inpt   Age/Gender 30year old male MRN [id number]   Location [facility] Attending Lorena Albarran MD   Hosp Day # 0 PCP Telma Michele MD       Anesthesia Post-op

## 2019-10-16 RX ORDER — ACETAMINOPHEN 500 MG
1000 TABLET ORAL 3 TIMES DAILY
Status: DISCONTINUED | OUTPATIENT
Start: 2019-10-16 | End: 2019-10-18

## 2019-10-16 RX ORDER — GABAPENTIN 300 MG/1
300 CAPSULE ORAL 3 TIMES DAILY
Status: DISCONTINUED | OUTPATIENT
Start: 2019-10-16 | End: 2019-10-20

## 2019-10-16 RX ORDER — CELECOXIB 100 MG/1
100 CAPSULE ORAL 2 TIMES DAILY
Status: DISCONTINUED | OUTPATIENT
Start: 2019-10-16 | End: 2019-10-20

## 2019-10-16 RX ORDER — NICOTINE 21 MG/24HR
1 PATCH, TRANSDERMAL 24 HOURS TRANSDERMAL DAILY
Status: DISCONTINUED | OUTPATIENT
Start: 2019-10-16 | End: 2019-10-20

## 2019-10-16 RX ORDER — OXYCODONE HYDROCHLORIDE 5 MG/1
5 TABLET ORAL EVERY 4 HOURS PRN
Status: DISCONTINUED | OUTPATIENT
Start: 2019-10-16 | End: 2019-10-17

## 2019-10-16 RX ORDER — QUETIAPINE 25 MG/1
50 TABLET, FILM COATED ORAL NIGHTLY
Status: DISCONTINUED | OUTPATIENT
Start: 2019-10-16 | End: 2019-10-20

## 2019-10-16 RX ORDER — MORPHINE SULFATE 4 MG/ML
2 INJECTION, SOLUTION INTRAMUSCULAR; INTRAVENOUS
Status: DISCONTINUED | OUTPATIENT
Start: 2019-10-16 | End: 2019-10-17

## 2019-10-16 NOTE — PLAN OF CARE
Received at shift change. A&Ox4, anxious. Neuro assessment wdl. Lungs clear on RA. IS reaching [phone]. Episode of low BP requiring IV bolus x1, vitals otherwise stable. Amb to chair with LSO brace/walker, tolerated well. Diet advanced to regular.  [place]

## 2019-10-16 NOTE — CONSULTS
Critical Care H&P/Consult     NAME: Nitish Henderson - ROOM: [address] [place] [address] - MRN: [id number] - Age: 30year old - :  3/29/1990    Date of Admission: 10/15/2019  7:36 AM  Admission Diagnosis: Kyphosis of thoracic region, unspecified kyphosis type [M40.204]  Co Kevin George MD at [facility]   • OTHER SURGICAL HISTORY  2006    left adrenal gland   • OTHER SURGICAL HISTORY  1/21/11    cysto stent removal- Dr. Finesse Bro   • OTHER SURGICAL HISTORY  4/20/11    cysto stent removal- Dr. Emerald Lua   • OTHER SURGICAL HI PRN  [] Labetalol HCl (TRANDATE) injection 5 mg, 5 mg, Intravenous, Q5 Min PRN  [] ondansetron HCl (ZOFRAN) injection 4 mg, 4 mg, Intravenous, PRN  [] Metoclopramide HCl (REGLAN) injection 10 mg, 10 mg, Intravenous, PRN  [] Trim PRN  [COMPLETED] ceFAZolin sodium (ANCEF/KEFZOL) 2 GM/20ML premix IV syringe 2 g, 2 g, Intravenous, Q8H  oxyCODONE HCl (OXY-IR) cap/tab 5 mg, 5 mg, Oral, Q6H    Or  oxyCODONE HCl (OXY-IR) cap/tab 5 mg, 5 mg, Oral, Q6H    Or  morphINE Sulfate IR (MSIR) tab 10/15/19  2117 10/16/19  0452   * 115*   BUN 13 10   CREATSERUM 1.07 0.86   GFRAA 106 134   GFRNAA 92 116   CA 8.4* 8.8    140   K 4.2 3.8   * 111   CO2 20.0* 22.0     No results for input(s): TROP, CK in the last 168 hours.     Imaging:

## 2019-10-16 NOTE — OCCUPATIONAL THERAPY NOTE
OCCUPATIONAL THERAPY EVALUATION - INPATIENT     Room Number: 462/462-A  Evaluation Date: 10/16/2019  Type of Evaluation: Initial  Presenting Problem: T11 and T12 compression fracture.  s/p  posterior T11-L2 spinal fusion, T12 pedicle subtraction osteotomy, unspecified kyphosis type    Status post thoracic spinal fusion    Status post lumbar spinal fusion      Past Medical History  Past Medical History:   Diagnosis Date   • Adrenal mass [address] [address]    • Alcohol abuse 2013   • Anxiety    • Back problem    • KIDNEY S independent with all ADL and functional mobility without an AD. SUBJECTIVE   \"There is pressure, is that normal?\"   \"That morphine didn't even touch me. Dilaudid works really well. \"     Patient self-stated goal is none identified      OBJECTIVE  Pr for donning LSO brace. Pt required total A for donning socks. Pt reports severe pain in his upper and lower back. RN aware. Pt performed supine>sit EOB with mod assistance via log rolling with HOB slightly elevated.  Pt performed sit>stand with RW and min a patient’s ability to return safely to his prior level of function.     Patient Complexity  Occupational Profile/Medical History MODERATE - Expanded review of history including review of medical or therapy record   Specific performance deficits impacting eng

## 2019-10-16 NOTE — PLAN OF CARE
Transferred fr ICU via wheelchair. Oriented to room. Family at bedside. Pain meds given x 1  Up in chair at this time.

## 2019-10-16 NOTE — CONSULTS
General Medicine Consult      Reason for consult: post-op medical management     Consulted by: Dr. Scarlett Mendoza    PCP: Ad Dunne MD      History of Present Illness: Patient is a 30year old male with PMH sig for ETOH abuse (sober x 1 month), anxiety, tobacco u GI ENDOSCOPY PERFORMED  12/12= Erosive esophagitis, gastritis. H pylori negative        HOME MEDS  ergocalciferol 21904 units Oral Cap, Take 50,000 Units by mouth every 7 days. , Disp: , Rfl:   ALPRAZolam 0.5 MG Oral Tab, Take 0.5 mg by mouth 4 (four) ti diphenhydrAMINE **OR** diphenhydrAMINE HCl       ALL:    Lamictal                ANAPHYLAXIS     Soc Hx:  Social History    Tobacco Use      Smoking status: Current Every Day Smoker        Packs/day: 0.10        Years: 15.00        Pack years: 1.5        T thoracic region. TECHNIQUE: Noncontrast axial images of the thoracic and lumbar spine were obtained. Coronal and sagittal reformatted images were reviewed.  Dose reduction CT scan done according to ALARA (As Low as Reasonably Achievable) or ALARA/IMAGE GENT INTERVERTEBRAL DISCS: Mild loss of disc height is seen throughout the thoracic spine with vacuum disc degeneration at T7-T8, T10-T11. SOFT TISSUES: NO paraspinal fluid collection or mass is seen. NO aortic aneurysm is seen.  There is mild thickening of the mild foraminal protrusion of disc on the LEFT. NO significant LEFT foraminal stenosis.  L3-L4: Moderate paracentral diffuse disc bulge with minimal central canal stenosis and moderate subarticular recess stenosis impinging on the descending RIGHT L4 nerve r Forrest Denver, MD on 10/15/2019 at 17:10     Approved by: Forrest Denver, MD on 10/15/2019 at 17:12               ASSESSMENT / PLAN:    # T11 and T12 compression fx s/p T9-L2 fusion and kyphoplasty  - Management per ortho spine  - no anticoagulants

## 2019-10-16 NOTE — PHYSICAL THERAPY NOTE
PHYSICAL THERAPY EVALUATION - INPATIENT     Room Number: 462/462-A  Evaluation Date: 10/16/2019  Type of Evaluation: Initial  Physician Order: PT Eval and Treat    Presenting Problem: T11 and T12 compression fracture.  s/p  posterior T11-L2 spinal fus removal    • OTHER SURGICAL HISTORY  10-10-12    cysto right rpg, right urs, stone manip   • OTHER SURGICAL HISTORY  11-12-12    s/p ralap right pyeloplasty [facility], Dr [facility]   • UPPER GI ENDOSCOPY PERFORMED  12/12= Erosive esophagitis, gastritis.     H pylori -   Sitting down on and standing up from a chair with arms (e.g., wheelchair, bedside commode, etc.): A Little   -   Moving from lying on back to sitting on the side of the bed?: A Little   How much help from another person does the patient currently nee pedicle subtraction osteotomy, L1-L3 Kyphoplasty. .  Pertinent comorbidities and personal factors impacting therapy include . Osteoporosis, alcohol abuse disorder.  In this PT evaluation, the patient presents with the following impairments gait dysfunction,

## 2019-10-16 NOTE — PROGRESS NOTES
Pt in no apparent distress, vitals stable, eyes closed, resting in bed, calm. Pt intermittently awakens complaining of \"10/10\" pain before quickly falling back to sleep. CPOT scale 0. Dr. Valentino Stover at bedside.  Per Dr. Valentino Stover, okay to d/c from PACU and trans

## 2019-10-16 NOTE — PLAN OF CARE
Pt received this AM, A&OX4, anxious at times, neuro assessment intact. O2 maintained on RA. BP stable, afebrile, NSR on monitor. [place] d/c'd intact. Tolerating general diet. [place] d/c, voiding per urinal.  Pain management with PO/IV meds.   Up to chair pain and evaluate response  - Implement non-pharmacological measures as appropriate and evaluate response  - Consider cultural and social influences on pain and pain management  - Manage/alleviate anxiety  - Utilize distraction and/or relaxation techniques

## 2019-10-16 NOTE — PROGRESS NOTES
ICU  Critical Care APRN Progress Note    NAME: Scott Metcalf - ROOM: [address] [place] [address] - MRN: [id number] - Age: 30year old - :3/29/1990    History Of Present Illness:  Scott Metcalf is a 30year old male with PMHx significant for pain and kyphosis in the th JVD, neck supple, no adenopathy, trachea midline, no carotid bruits  Lungs: Clear to auscultation bilaterally, respirations unlabored  Heart: Regular rate and rhythm, S1 and S2 normal, no murmur, rub or gallop  Abdomen: Soft, non-tender, bowel sounds activ Approved by: More Perry MD on 10/15/2019 at 17:12            Labs:  Lab Results   Component Value Date    WBC 13.7 10/15/2019    HGB 11.8 10/15/2019    HCT 37.1 10/15/2019    .0 10/15/2019    PTT 31.4 10/15/2019    INR 1.05 10/15/2019

## 2019-10-16 NOTE — OPERATIVE REPORT
[facility]    PATIENT'S NAME: Kaleigh Martin   ATTENDING PHYSICIAN: Miranda Sampson M.D. OPERATING PHYSICIAN: Miranda Sampson M.D.    PATIENT ACCOUNT#:   [de-identified]    LOCATION:  PACU [facility] PACU 3 EDWP 10  MEDICAL RECORD #:   [id number]       DATE OF BIRTH: treatment. The risk, benefits, and alternatives of operative versus nonoperative treatment were discussed with the patient. The patient agreeable to operative intervention.       OPERATIVE TECHNIQUE:  After careful identification, patient was brought into placed into the T9 vertebral body and similarly in percutaneous fashion using Jamshidis to cannulate the pedicle. About 3.5 mL were placed into the L3 vertebral body.   This was done in order to balance some of the stresses in the void, future distal junct afforded and final tightened. Posterior elements were decorticated and local bone graft and femoral head allograft was then placed into the posterior gutters. DuraSeal was placed over the dura in order to protect the dural space and spinal cord.   A side-

## 2019-10-16 NOTE — CDS QUERY
Impaired Skeletal Integrity  [facility]    Dear Dr. Gino Disla,  Clinical information (provided below) suggests a condition associated with or contributing to the fracture.  For accurate ICD-10-CM code assignment to reflect severi Let's see if your lab results show a reason for your osteoporosis/fractures.”              For questions regarding this query, please contact: Radames Rivera RN, BSN Clinical Documentation                              [id number]

## 2019-10-17 ENCOUNTER — APPOINTMENT (OUTPATIENT)
Dept: GENERAL RADIOLOGY | Facility: HOSPITAL | Age: 29
DRG: 455 | End: 2019-10-17
Attending: ORTHOPAEDIC SURGERY
Payer: COMMERCIAL

## 2019-10-17 ENCOUNTER — APPOINTMENT (OUTPATIENT)
Dept: GENERAL RADIOLOGY | Facility: HOSPITAL | Age: 29
DRG: 455 | End: 2019-10-17
Attending: INTERNAL MEDICINE
Payer: COMMERCIAL

## 2019-10-17 PROCEDURE — 72082 X-RAY EXAM ENTIRE SPI 2/3 VW: CPT | Performed by: ORTHOPAEDIC SURGERY

## 2019-10-17 PROCEDURE — 71045 X-RAY EXAM CHEST 1 VIEW: CPT | Performed by: INTERNAL MEDICINE

## 2019-10-17 RX ORDER — CALCIUM CARBONATE 200(500)MG
1000 TABLET,CHEWABLE ORAL 3 TIMES DAILY PRN
Status: DISCONTINUED | OUTPATIENT
Start: 2019-10-17 | End: 2019-10-20

## 2019-10-17 RX ORDER — OXYCODONE HYDROCHLORIDE 15 MG/1
7.5 TABLET ORAL EVERY 4 HOURS PRN
Status: DISCONTINUED | OUTPATIENT
Start: 2019-10-17 | End: 2019-10-19

## 2019-10-17 RX ORDER — HYDROMORPHONE HYDROCHLORIDE 1 MG/ML
0.5 INJECTION, SOLUTION INTRAMUSCULAR; INTRAVENOUS; SUBCUTANEOUS EVERY 6 HOURS PRN
Status: DISCONTINUED | OUTPATIENT
Start: 2019-10-17 | End: 2019-10-18

## 2019-10-17 RX ORDER — DIAZEPAM 10 MG/1
10 TABLET ORAL EVERY 8 HOURS PRN
Status: DISCONTINUED | OUTPATIENT
Start: 2019-10-17 | End: 2019-10-19

## 2019-10-17 RX ORDER — CALCIUM CARBONATE 200(500)MG
500 TABLET,CHEWABLE ORAL 3 TIMES DAILY PRN
Status: DISCONTINUED | OUTPATIENT
Start: 2019-10-17 | End: 2019-10-20

## 2019-10-17 NOTE — PAYOR COMM NOTE
--------------  CONTINUED STAY REVIEW-----REQUESTING ADDITIONAL DAY 10/16      Payor: [address] [facility]  Subscriber #:  [id number]  Authorization Number: [id number]    Admit date: 10/15/19  Admit time: 1956    Admitting Physician: Alena Bender • Adrenal mass [facility])     • Alcohol abuse 2013   • Anxiety     • Back problem     • KIDNEY STONE     • OTHER DISEASES 2006     L adrenal mass Felicitas Forbes)   • Visual impairment              Past Surgical History:   Procedure Laterality Date   • ABDOMINAL SCAR REVI Lamictal                ANAPHYLAXIS  No current outpatient medications on file.   influenza vaccine split quad (FLULAVAL) ages 7 months to 59 years inj 0.5ml, 0.5 mL, Intramuscular, Prior to discharge  nicotine (NICODERM CQ) 14 MG/24HR 1 patch, 1 patch, Tra pregabalin (LYRICA) cap 50 mg, 50 mg, Oral, BID  Senna (SENOKOT) tab 17.2 mg, 17.2 mg, Oral, Nightly  docusate sodium (COLACE) cap 100 mg, 100 mg, Oral, BID  PEG 3350 (MIRALAX) powder packet 17 g, 17 g, Oral, Daily PRN  magnesium hydroxide (MILK OF Asher Socks Lungs: clear                Chest wall: No tenderness or deformity. Heart: Regular rate and rhythm, normal S1S2, no murmur. Abdomen: soft, non-tender, non-distended, positive BS.                 Extremity: No club 10/17/2019 0804 Given 100 mg Oral Roman CordialCHERYL    10/16/2019 2102 Given 100 mg Oral Alyson Sanders RN      docusate sodium (COLACE) cap 100 mg     Date Action Dose Route User    10/17/2019 0805 Given 100 mg Oral Roman CHERYL Blanc    10/16/ 10/16/2019 2101 Given 100 mg Oral Alyson Sanders RN      QUEtiapine Fumarate (SEROQUEL) tab 50 mg     Date Action Dose Route User    10/16/2019 2101 Given 50 mg Oral Alyson Sanders RN      Senna (SENOKOT) tab 17.2 mg     Date Action Dose Route U

## 2019-10-17 NOTE — OCCUPATIONAL THERAPY NOTE
Attempted to see patient for OT treatment session, however patient declining services this am d/t severe pain and just received pain meds. Will re-attempt as schedule permits.

## 2019-10-17 NOTE — PLAN OF CARE
States pain well controlled on oral and iv pain meds and gel ice therapy to back. Has ambulated in hallway, and up in chair, states tolerated well. Gait steady. Dressing to back clean, dry, intact. Drain with sanguinous drainage in bulb.   Instructed on

## 2019-10-17 NOTE — OCCUPATIONAL THERAPY NOTE
OCCUPATIONAL THERAPY TREATMENT NOTE - INPATIENT     Room Number: 358/358-A  Session: 1  Number of Visits to Meet Established Goals: 4    Presenting Problem: T11 and T12 compression fracture.  s/p  posterior T11-L2 spinal fusion, T12 pedicle subtraction oste Medical History  Past Medical History:   Diagnosis Date   • Adrenal mass [facility])    • Alcohol abuse 2013   • Anxiety    • Back problem    • KIDNEY STONE    • OTHER DISEASES 2006    L adrenal mass Leonel Abrams)   • Visual impairment        Past Surgical History  Past ASSESSMENT  AM-PAC ‘6-Clicks’ Inpatient Daily Activity Short Form  How much help from another person does the patient currently need…  -   Putting on and taking off regular lower body clothing?: None  -   Bathing (including washing, rinsing, drying)?: None questions and concerns addressed; Ice applied    ASSESSMENT   Patient is functioning at mod I to supervision level for basic self care and functional mobility. Demonstrates good understanding regarding precautions.   Patient has good family support and goo

## 2019-10-17 NOTE — PROGRESS NOTES
Increased heart rate d/t anxiety and pain as per pt. Relaxation techniques done with the pt. Xanax given PRN.  Will monitor

## 2019-10-17 NOTE — PLAN OF CARE
Pt's pain is severe - medicated with oral and IV pain meds PRN. Pain management plan explained. Hx of anxiety and pt was educated about relaxation techniques. Gauze + tegaderm dressing C/D/I. ABNER drain x 1. Up with min assist using the walker.  Chairback bra

## 2019-10-17 NOTE — CM/SW NOTE
31 yo sp thoracic/lumbar spine surgery after falling and suffering thoracic fractures. PT is recommending home health. HOME SITUATION  Type of Home: House   Home Layout: Two level  Stairs to Bedroom: 12  Railing:  Yes     Lives With: Parent(s)  Pilo Mitchell

## 2019-10-17 NOTE — PROGRESS NOTES
[facility] Hospitalist Team  Progress Note      Karenaeric Jose  3/29/1990    Assessment/Plan:       # T11 and T12 compression fx s/p T9-L2 fusion and kyphoplasty  - Management per ortho spine  - no anticoagulants  - PT/OT    #Fever  -ua clean  -chest xr suggests 50 mg Oral Nightly   • acetaminophen  1,000 mg Oral TID   • gabapentin  300 mg Oral TID   • celecoxib  100 mg Oral BID   • QUEtiapine Fumarate  100 mg Oral Nightly   • multivitamin  1 tablet Oral QAM   • ergocalciferol  50,000 Units Oral Q7 Days   • Senna

## 2019-10-17 NOTE — PHYSICAL THERAPY NOTE
PHYSICAL THERAPY TREATMENT NOTE - INPATIENT    Room Number: 358/358-A     Session: 1  Number of Visits to Meet Established Goals: 4    Presenting Problem: T11 and T12 compression fracture.  s/p  posterior T11-L2 spinal fusion, T12 pedicle subtraction osteo stent removal    • OTHER SURGICAL HISTORY  10-10-12    cysto right rpg, right urs, stone manip   • OTHER SURGICAL HISTORY  11-12-12    s/p ralap right pyeloplasty [facility], Dr Ivon Soni   • UPPER GI ENDOSCOPY PERFORMED  12/12= Erosive esophagitis, gastritis.     H p spine precautions. Pt able to adjust brace for ice placement independently. Pt performed transfer with MOD I. Pt ambulated 300ft sans AD with MOD I. Pt ambulates with steady step through gait pattern and good dinora.  Pt ascended/descended 4 stairs with MO 10/17/2019

## 2019-10-17 NOTE — CDS QUERY
Eliza     Dear Dr. Estela Castaneda,  Clinical documentation (provided below) suggests a finding associated with Spinal Alignment.  For accurate ICD-10_CM code assignment to reflect severity of illness and risk of mortality,  PLEASE

## 2019-10-17 NOTE — HOME CARE LIAISON
MET WITH PTNT AND OFFERED CHOICE  OF AGENCIES. PTNT AGREEABLE TO [facility]. MET WITH PTNT TO DISCUSS HOME HEALTH SERVICES AND COVERAGE CRITERIA. PTNT AGREEABLE TO [facility]. PTNT GIVEN RESIDENTIAL BROCHURE.  RESIDENTIAL WITH PROVIDE SN/PT ON DISC

## 2019-10-18 RX ORDER — HYDROMORPHONE HYDROCHLORIDE 1 MG/ML
0.5 INJECTION, SOLUTION INTRAMUSCULAR; INTRAVENOUS; SUBCUTANEOUS EVERY 12 HOURS PRN
Status: DISCONTINUED | OUTPATIENT
Start: 2019-10-18 | End: 2019-10-20

## 2019-10-18 RX ORDER — OXYCODONE HYDROCHLORIDE AND ACETAMINOPHEN 5; 325 MG/1; MG/1
2 TABLET ORAL EVERY 6 HOURS PRN
Status: DISCONTINUED | OUTPATIENT
Start: 2019-10-18 | End: 2019-10-19

## 2019-10-18 RX ORDER — HYDROMORPHONE HYDROCHLORIDE 1 MG/ML
0.3 INJECTION, SOLUTION INTRAMUSCULAR; INTRAVENOUS; SUBCUTANEOUS ONCE
Status: COMPLETED | OUTPATIENT
Start: 2019-10-18 | End: 2019-10-18

## 2019-10-18 RX ORDER — OXYCODONE AND ACETAMINOPHEN 10; 325 MG/1; MG/1
1 TABLET ORAL EVERY 6 HOURS PRN
Qty: 60 TABLET | Refills: 0 | Status: SHIPPED | OUTPATIENT
Start: 2019-10-18 | End: 2019-10-19

## 2019-10-18 NOTE — PAYOR COMM NOTE
--------------  CONTINUED STAY REVIEW    Payor: [employer]  Subscriber #:  [id number]  Authorization Number: [id number]    Admit date: 10/15/19  Admit time: 1956    Admitting Physician: Trinity Kirk MD  Attending Physician:  Scarlett Mendoza As surgery if lack of patient mobilization please discuss with me  - PT/Rehab: Gait Training, Stairs, No bending, lifting, twisting, brace to be worn if provided to patient  - Dispo: dc likely tomorrow  Doris Duke MD  Spine and Scoliosis Surgeon  [facility] 1036 Given 0.5 mg Intravenous Janny Charles RN    10/18/2019 0416 Given 0.5 mg Intravenous Judy Redding RN    10/17/2019 2221 Given 0.5 mg Intravenous Judy Redding RN    10/17/2019 1501 Given 0.5 mg Intravenous Anabel Mariscal RN thoracolumbar spine and placement of bilateral pedicle screws and instrumentation, T10, T11, L1, L2.    2.       Vertebral augmentation with PMMA cement, T9, T10, L2, and L3.    3.       Pedicle retraction osteotomy/3-column osteotomy, T12 vertebral body.

## 2019-10-18 NOTE — PROGRESS NOTES
Spine Service Progress Note    24hrs/Events:   none    Subjective:   Pain at surgical site.  Otherwise walking well    Objective:   /77 (BP Location: Right arm)   Pulse 98   Temp 98.1 °F (36.7 °C) (Oral)   Resp 18   Ht 5' 6\" (1.676 m)   Wt 163 lb

## 2019-10-18 NOTE — PLAN OF CARE
Pt Ax4. Episode of anxiety this AM, xanax given and nicotine patch applied. Pt improved. , RA, On tele-ST. Up stand by assist, chairback brace when OOB. ABNER drain removed today per orders, dressing changed to coverlet. Gel ice PRN.  Pain control issues, D

## 2019-10-18 NOTE — PROGRESS NOTES
[facility] Hospitalist Team  Progress Note      Johny Luz  3/29/1990    Assessment/Plan:       # T11 and T12 compression fx s/p T9-L2 fusion and kyphoplasty  - Management per ortho spine, adjustments to pain meds made per surgery  - no anticoagulants  - PT/ Lab 10/17/19  0455   ALT 26   AST 34   ALB 3.0*       No results for input(s): PGLU in the last 168 hours.     Meds:   Scheduled:   • nicotine  1 patch Transdermal Daily   • QUEtiapine Fumarate  50 mg Oral Nightly   • gabapentin  300 mg Oral TID   • celec

## 2019-10-18 NOTE — PLAN OF CARE
Pt pain managed with Oxy IR, Valium, and IV Dilaudid. VSS. HR improved to high 90s-low 100s after bolus. Ambulating well, chairback brace in place when OOB. Voiding without difficulty. Plan: home with HHPT when ready. Will monitor.

## 2019-10-18 NOTE — PROGRESS NOTES
Guidebook provided to patient. Reviewed indications, side effects of pain medication/narcotics and constipation prevention.  Stressed importance of increased fluids/roughage in diet, continued use stool softeners along with laxatives and suppositories as ne

## 2019-10-18 NOTE — PLAN OF CARE
Pt HR elevated to 110-144 while sleeping. Sinus tach on monitor. Rates pain 4/10. BP 96/67. Temp 98.3. Denies dizziness, SOB, chest pain. Pt states \"I feel perfectly fine but now I am getting really anxious about the heart rate. \" Paged Dr. Manda Turcios to no

## 2019-10-18 NOTE — PROGRESS NOTES
Patient attended discharge spine education class. Printed discharge education sheet provided and reviewed. Teach back done. Questions solicited and answered. Tolerated activity well.

## 2019-10-19 RX ORDER — OXYCODONE HYDROCHLORIDE 10 MG/1
TABLET ORAL EVERY 4 HOURS PRN
Qty: 30 TABLET | Refills: 0 | Status: SHIPPED | OUTPATIENT
Start: 2019-10-19 | End: 2019-12-17 | Stop reason: ALTCHOICE

## 2019-10-19 RX ORDER — NALOXONE HYDROCHLORIDE 4 MG/.1ML
4 SPRAY, METERED NASAL AS NEEDED
Qty: 1 KIT | Refills: 0 | Status: SHIPPED | OUTPATIENT
Start: 2019-10-19 | End: 2021-03-31 | Stop reason: ALTCHOICE

## 2019-10-19 RX ORDER — CYCLOBENZAPRINE HCL 10 MG
10 TABLET ORAL EVERY 6 HOURS
Qty: 20 TABLET | Refills: 0 | Status: SHIPPED | OUTPATIENT
Start: 2019-10-19 | End: 2019-10-24

## 2019-10-19 RX ORDER — OXYCODONE HYDROCHLORIDE 10 MG/1
TABLET ORAL EVERY 4 HOURS PRN
Status: DISCONTINUED | OUTPATIENT
Start: 2019-10-19 | End: 2019-10-20

## 2019-10-19 RX ORDER — CYCLOBENZAPRINE HCL 10 MG
10 TABLET ORAL EVERY 6 HOURS
Status: DISCONTINUED | OUTPATIENT
Start: 2019-10-19 | End: 2019-10-20

## 2019-10-19 NOTE — PLAN OF CARE
PT. CONTINUES TO RATE POST OP PAIN FROM 8 TO 7 BUT TOLERATED AMBULATING TWICE IN THE HALLWAY WITH TLSO BRACE AND 1 STANDBY ASSIST AFTER PRN PERCOCET  AND VALIUM PLUS DILAUDID 0.5 ONCE AT BEDTIME. PT. ANXIOUS AT TIMES-HEART RATE IN 'S IMPROVED AFTER

## 2019-10-19 NOTE — PROGRESS NOTES
[facility] Hospitalist Team  Progress Note      Paddy Mcrae  3/29/1990    Assessment/Plan:       # T11 and T12 compression fx s/p T9-L2 fusion and kyphoplasty  - Management per ortho spine, ongoing pain issues, consulted pain management and discuss with mattie Labs   Lab 10/17/19  0455   ALT 26   AST 34   ALB 3.0*       No results for input(s): PGLU in the last 168 hours.     Meds:   Scheduled:   • Cyclobenzaprine HCl  10 mg Oral Q6H   • nicotine  1 patch Transdermal Daily   • QUEtiapine Fumarate  50 mg Oral Nigh

## 2019-10-19 NOTE — PROGRESS NOTES
Pain Service    POD #1 s/p T11 and T12 compression fx s/p T9-L2 fusion and kyphoplasty    Patient reported Percocet 5/325 and Valium were ineffective - better now after Mateo  1560 10-20 mg po q4h prn pain  Flexeril 10mg po q6h sc

## 2019-10-20 VITALS
DIASTOLIC BLOOD PRESSURE: 63 MMHG | HEART RATE: 113 BPM | OXYGEN SATURATION: 97 % | TEMPERATURE: 98 F | WEIGHT: 163.56 LBS | SYSTOLIC BLOOD PRESSURE: 98 MMHG | BODY MASS INDEX: 26.29 KG/M2 | RESPIRATION RATE: 13 BRPM | HEIGHT: 66 IN

## 2019-10-20 RX ORDER — ACETAMINOPHEN 325 MG/1
650 TABLET ORAL
Status: DISCONTINUED | OUTPATIENT
Start: 2019-10-20 | End: 2019-10-20

## 2019-10-20 RX ORDER — OXYCODONE HCL 10 MG/1
10 TABLET, FILM COATED, EXTENDED RELEASE ORAL EVERY 12 HOURS
Status: DISCONTINUED | OUTPATIENT
Start: 2019-10-20 | End: 2019-10-20

## 2019-10-20 NOTE — PLAN OF CARE
Chronic Pain Service consulted today for pt's lack of pain control. Pt feels his pain is better controlled with oxy ir and flexeril. Pt ambulating in halls with brace and standby assist. Anxious at times, had body shakes this am, xanax given prn.  HR up to

## 2019-10-20 NOTE — PLAN OF CARE
[facility]    [address] [facility] [address]    Phone:  [phone]           Sam Andrade   MRN: [id number]    Department:  [facility]   Date of Visit:  5/19/2017           Diagnoses     Epistaxis     Seasonal allergic rhinitis, unspecified allergic rhinitis trigger        You were seen by Eleazar Keating MD.      Disclaimer     Follow-up Care:  It is your responsibility to arrange for follow-up care with your healthcare provider or as instructed. Call to get an appointment time.           Contact your doctor for follow-up appointment if not already scheduled.     Follow up with No Pcp Neede. Call in 1 week.    Comments:  Return to the ER for worse symptoms or concerns      Preventive care and screening     Your blood pressure was (!) 136/94 today. If your blood pressure is higher than 120/80, we recommend follow up with your primary care provider to obtain basic health screening, including reassessment of your blood pressure, within three months.          Medications you received while in the ED through 05/19/2017  7:26 AM     None         What to Do with Your Medications      START taking these medications today unless otherwise stated        Details    Cetirizine HCl 10 MG Cap   Commonly known as:  ZYRTEC ALLERGY        Take 10 mg by mouth daily. As needed for seasonal allergies.   Authorizing Provider:  Eleazar Keating       fluticasone 50 MCG/ACT nasal spray   Commonly known as:  FLONASE ALLERGY RELIEF        Spray 1-2 sprays in each nostril daily.   Authorizing Provider:  Eleazar Keating                             Where to Get Your Medications      You can get these medications from any pharmacy     Bring a paper prescription for each of these medications     Cetirizine HCl 10 MG Cap    fluticasone 50 MCG/ACT nasal spray               Procedures     None      Imaging Results     None        Discharge Instructions         Allergic Rhinitis  Allergic  Pt Aox4. R.A. dressing to back with old drainage, over 50% saturated with old drainage and peeling off d/t brace. Dressing changed, coverlet with gauze to old drain site. Per Dr. Renard Disla if incision no longer draining may leave open to air, pt made aware. rhinitis is an allergic reaction that affects the nose, and often the eyes. It’s often known as nasal allergies. Nasal allergies are often due to things in the environment that are breathed in. Depending what you are sensitive to, nasal allergies may occur only during certain seasons. Or they may occur year round. Common indoor allergens include house dust mites, mold, cockroaches, and pet dander. Outdoor allergens include pollen from trees, grasses, and weeds.   Symptoms include a drippy, stuffy, and itchy nose. They also include sneezing and red and itchy eyes. You may feel tired more often. Severe allergies may also affect your breathing and trigger a condition called asthma.   Tests can be done to see what allergens are affecting you. You may be referred to an allergy specialist for testing and further evaluation.  Home care  The healthcare provider may prescribe medicines to help relieve allergy symptoms.   Ask the provider for advice on how to avoid substances that you are allergic to. Below are a few tips for each type of allergen.  Pet dander:  · Do not have pets with fur and feathers.  · If you cannot avoid having a pet, keep it out of your bedroom and off upholstered furniture.  Pollen:  · When pollen counts are high, keep windows of your car and home closed. If possible, use an air conditioner instead.  · Wear a filter mask when mowing or doing yard work.  House dust mites:  · Wash bedding every week in warm water and detergent and dry on a hot setting.  · Cover the mattress, box spring, and pillows with allergy covers.   · If possible, sleep in a room with no carpet, curtains, or upholstered furniture.  Cockroaches:  · Store food in sealed containers.  · Remove garbage from the home promptly.  · Fix water leaks  Mold:  · Keep humidity low by using a dehumidifier or air conditioner. Keep the dehumidifier and air conditioner clean and free of mold.  · Clean moldy areas with bleach and water.  In  general:  · Vacuum once or twice a week. If possible, use a vacuum with a high-efficiency particulate air (HEPA) filter.  · Do not smoke. Avoid cigarette smoke. Cigarette smoke is an irritant that can make symptoms worse.  Follow-up care  Follow up as advised by the health care provider or our staff. If you were referred to an allergy specialist, make this appointment promptly.  When to seek medical advice  Call your healthcare provider right away if the following occur:  · Coughing or wheezing  · Fever greater than 100.4°F (38°C)  · Continuing symptoms, new symptoms, or worsening symptoms  Call 911 right away if you have:  · Trouble breathing  · Hives (raised red bumps)  · Severe swelling of the face or severe itching of the eyes or mouth  © 2115-8199 [employer]. [address] [place] [address], [place] [address]. All rights reserved. This information is not intended as a substitute for professional medical care. Always follow your healthcare professional's instructions.          Nosebleed (Adult)    Bleeding from the nose most commonly occurs due to injury or drying and cracking of the inner lining of the nose. Most nosebleeds are due to dry air or nose-picking. They can occur during a common cold or an allergy attack. They can also occur on a very hot day, or from dry air in the winter.  If the bleeding site is found, it may be cauterized (treated to cause a blood clot to form). This may be done with a chemical, heat, or electricity. If the bleeding continues after the site is cauterized, or if the site cannot be found, packing may be placed in your nose. This is to apply pressure and stop the bleeding. The packing may be made of gauze or sponge. A small balloon catheter is sometimes used. These must be removed by your doctor. Some types of packing dissolve on their own.  Home care  · If packing was put in your nose, unless told otherwise, do not pull on it or try to remove it yourself. You will be given an  appointment to have it removed. You may also have been given antibiotics to prevent a sinus infection. If so, finish all of the medicine.  · Do not blow your nose for 12 hours after the bleeding stops. This will allow a strong blood clot to form. Do not pick your nose. This may restart bleeding.  · Avoid drinking alcohol and hot liquids for the next two days. Alcohol or hot liquids in your mouth can dilate blood vessels in your nose. This can cause bleeding to start again.  · Do not take ibuprofen, naproxen, or aspirin-containing medicines. These thin the blood and may promote nose bleeding. You may take acetaminophen for pain, unless another pain medicine was prescribed.  · If the bleeding starts again, sit up and lean forward to prevent swallowing blood. Pinch your nose tightly on both sides, as depicted above, for 10 to 15 minutes.  Time yourself, and don’t release the pressure on your nose until 10 minutes is up. If bleeding is not controlled, continue to pinch your nose and call your health care provider or return to this facility.  · If you have a cold, allergies, or dry nasal membranes, lubricate the nasal passages. Apply a small amount of petroleum jelly inside the nose with a cotton swab twice a day (morning and night).  · Avoid overheating your home, which can dry the air and worsen your condition.  · A humidifier in the room where you sleep will add moisture to the air.  · Use a saline nasal spray to keep nasal passages moist.  · Do not pick your nose. Keep fingernails trimmed to decrease risk of bleeds.  Follow-up care  Follow up with your health care provider, or as advised. Nasal packing should be rechecked or removed within 2 to 3 days.  When to seek medical advice  Call your health care provider right away if any of these occur.  · Another nosebleed that you cannot control  · Dizziness, weakness or fainting  · You become tired or confused  · Fever of 100.4ºF (38ºC) or higher, or as directed by your  health care provider  · Headache  · Sinus or facial pain  · Shortness of breath or trouble breathing  © 7072-8020 [employer]. [address] [place] [address], [place] [address]. All rights reserved. This information is not intended as a substitute for professional medical care. Always follow your healthcare professional's instructions.          Seasonal Allergy  Seasonal Allergy is also called “Hay Fever.” It may occur after a person is exposed to pollens released from grasses, weeds, trees and shrubs. This type of allergy occurs during the spring and summer when the pollen contacts the lining of the nose, eyes, eyelids, sinuses and throat. This causes “histamine” to be released from the tissues.  Histamine causes itching and swelling.  This may produce a watery discharge from the eyes or nose. Violent sneezing, nasal congestion, post-nasal drip, itching of the eyes, nose, throat and mouth, scratchy throat, and dry cough may also occur.    Home care  Seasonal allergy cannot be cured, but symptoms can be reduced by these measures:  · Avoid or reduce exposure to the allergen as much as you can:    ¨ Stay indoors on windy days of pollen season.   ¨ Keep windows and doors closed. Use air conditioning instead in your home and car. This filters the air.  ¨ Change air conditioner filters often.  · Decongestant pills and sprays reduce tissue swelling and watery discharge. Overuse of nasal decongestant sprays may make symptoms worse. Do not use these more often than recommended. Sometimes you can experience a rebound effect (symptoms worsen), when stopping them. Talk to your healthcare provider or pharmacist about these medicines before taking them, especially if you have hypertension or heart problems.   · Antihistamines block the release of histamine during the allergic response. They work better when taken BEFORE symptoms develop. Unless a prescription antihistamine was prescribed, you can take over-the-counter  antihistamines that do not cause drowsiness.  Ask your pharmacist for suggestions.  · Steroid nasal sprays or oral steroids may also be prescribed for more severe symptoms. These help to reduce the local inflammation that can add to the allergic response.  · If you have asthma, pollen season may make your asthma symptoms worse. It is important that you use your asthma medicines as directed during this time to prevent or treat attacks. Some persons with ASTHMA have asthma symptoms that get worse when they take antihistamines. This is due to the drying effect on the lungs. If you notice this, stop the antihistamines, drink extra fluids and notify your doctor.  · If you have sinus congestion or drainage, a saline nasal rinse may give relief. A saline nasal rinse lessens the swelling and clears excess mucus. This allows sinuses to drain. Prepackaged kits are sold at most drug stores. These contain pre-mixed salt packets and an irrigation device.  Follow-up care  Follow up with your healthcare provider or as directed. If you have been referred to a specialist, make an appointment promptly.  When to seek medical advice  Call your healthcare provider for any of the following:  · Facial, ear or sinus pain; colored drainage from the nose  · Severe headache  · You have asthma and your asthma symptoms do not respond to the usual doses of your medicine  · Cough with lots of colored sputum (mucus)  · Fever of 100.4°F (38°C) or higher or as directed by the healthcare provider  Call 911 if any of these occur:  · Trouble breathing or swallowing, wheezing  · Hoarse voice or trouble speaking  · Confusion  · Very drowsy or trouble awakening  · Fainting or loss of consciousness  · Rapid heart rate  · Low blood pressure  · Feeling of doom  · Nausea, vomiting, abdominal pain, diarrhea  · Vomiting blood, or large amounts of blood in stool  · Seizure  © 3844-2632 [employer]. [address] [place] [address], [place] [address]. All  rights reserved. This information is not intended as a substitute for professional medical care. Always follow your healthcare professional's instructions.          Discharge References/Attachments     None      Medication Safety: What you need to know     Maintain Security - It is important to keep all medications in a secure location:  Keep out of the reach of children and pets    Consider using a lock box or locked filing cabinet    Place pill bottles in private area such as bedroom or drawer    Don't Share - It is illegal to share your prescription medication, even with family:  The doctor prescribes medications specifically for you and your body    You cannot be sure how the drug may affect others physically or emotionally    It is a criminal offense to share prescriptions    Proper Disposal - It is no longer acceptable to flush or throw away medications:  Recent studies show measurable amounts of medication have been found in drinking water and wildlife due to flushing or throwing away medications    Medication strength changes over time and is not typically safe after one year    Proper disposal removes the medication from your home in a safe way so that others don't have access to it. Use your local drug drop site.    Your local pharmacy can provide information on medication disposal options in your community. The Department of Justice Drug Enforcement Administration website also has information on safe medication disposal:  www.deadiversion.usdoj.gov/drug_disposal/index.html

## 2019-10-20 NOTE — PROGRESS NOTES
[facility] Hospitalist Team  Progress Note      Letitia Jacobs  3/29/1990    Assessment/Plan:       # T11 and T12 compression fx s/p T9-L2 fusion and kyphoplasty  - Management per ortho spine, pain controlled  - no anticoagulants  - PT/OT    #Tachycardia  -?pain input(s): PGLU in the last 168 hours.     Meds:   Scheduled:   • oxyCODONE HCl ER  10 mg Oral 2 times per day   • acetaminophen  650 mg Oral TID & HS   • Cyclobenzaprine HCl  10 mg Oral Q6H   • nicotine  1 patch Transdermal Daily   • QUEtiapine Fumarate  50

## 2019-10-20 NOTE — PLAN OF CARE
PT. AMBULATING IN HALLS WITH TLSO BRACE AND STANDBY ASSIST AT START OF SHIFT. PT. C/O OF SEVERE POSTOP PAIN AT HS. OXY 20 GIVEN BUT PT. REMARKED OF LITTLE RELIEF REQUESTING FOR DILAUDID 0.5 IV FOR BREAKTHROUGH PAIN WITH SCHEDULED FLEXERIL.  PT. TALKING ON T

## 2019-10-20 NOTE — PROGRESS NOTES
Pt cleared by all MD's for discharge. Spoke with  on phone in a.m. Script for oxy 10 written per hospitalist, given to pt. Discharge education completed with pt at bedside, all questions answered.  Pt states his pain continues to be well controll

## 2019-10-20 NOTE — PROGRESS NOTES
Post Op Day 2: s/p T9-L2 fusion and kyphoplasty    Assessed patient in bed. States pain is moderate while awake but when he wakes up after sleeping his pain is severe. Reports OxyIR and mobilizing help with pain.  States alprazolam \"doesn't do anything\" a

## 2019-10-21 NOTE — PAYOR COMM NOTE
--------------  DISCHARGE REVIEW    Payor: [facility]  Subscriber #:  [id number]  Authorization Number: [id number]    Admit date: 10/15/19  Admit time:  1956  Discharge Date: 10/20/2019 12:52 PM  Requesting extension to 10/20. Thank you. conservative care.   Patient underwent   Surgical Procedures     Case IDs Date Procedure Surgeon Location Status    [id number] 10/15/19 POSTERIOR LUMBAR LAMINECT SPINAL FUS W/INSTR 5 MADISON Kelley MD [facility] MAIN OR [facility]        Afterward, pt was brought to the  mg by mouth 2 (two) times daily. Morning and afternoon, Historical    !! - Potential duplicate medications found. Please discuss with provider.       STOP taking these medications    oxyCODONE-acetaminophen (PERCOCET) 5-325 MG Oral Tab          Rambo Plane             Recent Labs   Lab 10/15/19  2117 10/16/19  0452 10/17/19  0455    140 137   K 4.2 3.8 3.9   * 111 107   CO2 20.0* 22.0 28.0   BUN 13 10 8   CREATSERUM 1.07 0.86 0.89   CA 8.4* 8.8 8.6   * 115* 94             Recent Labs   L — 113 13 — 97 % — — — SS   10/20/19 1138 98 °F (36.7 °C) 121Abnormal  20 98/63 99 % — None (Room air) — KD   10/20/19 0730 99.6 °F (37.6 °C) 106 18 — 98 % — None (Room air) — KD   10/20/19 0400 99 °F (37.2 °C) 101 20 119/64 96 % — None (Room air) — RR   10

## 2019-10-21 NOTE — DISCHARGE SUMMARY
[facility]  Discharge Summary    Maureen Barrientos Patient Status:  Inpatient    3/29/1990 MRN [id number]   [facility] 3SW-A Attending No att. providers found   [address] Day # 5 PCP Brad Medrano MD     Date of Admission: 10/15/2019    Date of discharge instructions were given and they were asked to follow up in clinic in 2 weeks.      Disposition: [facility]      Discharge Medications: Discharge Medication List as of 10/20/2019 11:58 AM    START taking these medications    OxyCODO

## 2019-10-21 NOTE — CM/SW NOTE
10/21/19 0800   Discharge disposition   Expected discharge disposition Home-Health   Name of Facillity/Home Care/Hospice Residential   Discharge transportation Private car   DC 10/20/19

## 2019-11-14 PROBLEM — M81.0 OSTEOPOROSIS: Status: ACTIVE | Noted: 2019-11-14

## 2020-01-10 PROBLEM — F11.90 OPIATE USE: Status: ACTIVE | Noted: 2020-01-10

## 2020-03-13 PROBLEM — S22.080S COMPRESSION FRACTURE OF T12 VERTEBRA, SEQUELA: Status: ACTIVE | Noted: 2020-03-13

## 2021-09-24 PROBLEM — J06.9 UPPER RESPIRATORY TRACT INFECTION, UNSPECIFIED TYPE: Status: ACTIVE | Noted: 2021-09-24

## 2021-09-24 PROBLEM — S29.012A MUSCLE STRAIN OF RIGHT UPPER BACK: Status: ACTIVE | Noted: 2021-09-24

## 2023-10-25 NOTE — ED PROVIDER NOTES
No care due was identified.  [facility] Embedded Care Due Messages. Reference number: [id number].   10/25/2023 8:01:35 AM CDT   Patient Seen in: [facility] Emergency Department    History   Patient presents with:  Back Pain (musculoskeletal)    Stated Complaint: back pain    HPI    Patient is a 15-year-old male, presenting for evaluation of left-sided back pain.     Patient s Smoker        Packs/day: 0.50        Years: 13.00        Pack years: 6.5        Types: Cigarettes      Smokeless tobacco: Current User      Tobacco comment: vaping    Alcohol use: Yes      Comment: pt reports drinking 1/2 of a fifth of vodka for the past 2 back.  No cutaneous changes.     ED Course     Labs Reviewed   COMP METABOLIC PANEL (14) - Abnormal; Notable for the following components:       Result Value    Sodium 135 (*)     Potassium 3.3 (*)     AST 56 (*)     Total Protein 8.5 (*)     Globulin  4.5 yesterday. FINDINGS:  KIDNEYS:  Normal anatomic positions. No renal or ureteral calculus. Again there is dilatation of both pelvicaliceal systems, left greater than right. Dilatation is most severe involving the left pelvis.   The ureters are normal c were reviewed discussed with the patient. Consultation was obtained from Dr. Melba Olguin, who reviewed the images. [place] brace was ordered and applied. A prescription for Motrin and Norco was provided.   Close outpatient follow-up will be facilita

## (undated) DEVICE — DERMABOND LIQUID ADHESIVE

## (undated) DEVICE — 3.0MM NEURO (MATCH HEAD) SOFT TOUCH

## (undated) DEVICE — THE MILL DISPOSABLE - FINE

## (undated) DEVICE — SUTURE VICRYL 2-0 CP-2

## (undated) DEVICE — TRANSPOSAL ULTRAFLEX DUO/QUAD ULTRA CART MANIFOLD

## (undated) DEVICE — INFLATABLE VERTEBRAL AUGMENTATION SYSTEM: Brand: IVAS

## (undated) DEVICE — CAUTERY BLADE 2IN INS E1455

## (undated) DEVICE — FLOTRAC SENSOR (84" / 213CM): Brand: FLOTRAC

## (undated) DEVICE — DRAPE TABLE COVER 44X90 TC-10

## (undated) DEVICE — SUTURE MONOCRYL 3-0 PS-2

## (undated) DEVICE — STERILE POLYISOPRENE POWDER-FREE SURGICAL GLOVES: Brand: PROTEXIS

## (undated) DEVICE — Device

## (undated) DEVICE — LIGHT HANDLE

## (undated) DEVICE — SOL  .9 1000ML BTL

## (undated) DEVICE — SUTURE VICRYL 1 CT-1

## (undated) DEVICE — DRAPE C-ARM UNIVERSAL

## (undated) DEVICE — FLOSEAL HEMOSTATIC MATRIX, 5ML: Brand: FLOSEAL HEMOSTATIC MATRIX

## (undated) DEVICE — CHLORAPREP 26ML APPLICATOR

## (undated) DEVICE — PROXIMATE RH ROTATING HEAD SKIN STAPLERS (35 WIDE) CONTAINS 35 STAINLESS STEEL STAPLES: Brand: PROXIMATE

## (undated) DEVICE — 11.1-M5 MULTIMODALITY KIT 5

## (undated) DEVICE — CEMENT CANNULA: Brand: VERTEPORT

## (undated) DEVICE — IRRIGATION SUCTION CASSETTE: Brand: SONOPET IQ

## (undated) DEVICE — GOWN,SIRUS,FABRIC-REINFORCED,X-LARGE: Brand: MEDLINE

## (undated) DEVICE — VIOLET BRAIDED (POLYGLACTIN 910), SYNTHETIC ABSORBABLE SUTURE: Brand: COATED VICRYL

## (undated) DEVICE — AVAFLEX CURVED NEEDLE: Brand: AVAFLEX

## (undated) DEVICE — Device: Brand: STABLECUT®

## (undated) DEVICE — KENDALL SCD EXPRESS SLEEVES, KNEE LENGTH, MEDIUM: Brand: KENDALL SCD

## (undated) DEVICE — MAXCESS MAS TLIF 2 KIT ACCESS

## (undated) DEVICE — COVER,MAYO STAND,STERILE: Brand: MEDLINE

## (undated) DEVICE — 3M™ IOBAN™ 2 ANTIMICROBIAL INCISE DRAPE 6650EZ: Brand: IOBAN™ 2

## (undated) DEVICE — LAMINECTOMY CDS: Brand: MEDLINE INDUSTRIES, INC.

## (undated) DEVICE — GOWN SURG AERO CHROME XXL

## (undated) DEVICE — 11CM IQ APEX KNIFE: Brand: SONOPET IQ

## (undated) NOTE — ED AVS SNAPSHOT
Marcio Moralez   MRN: [id number]    Department:  [facility] Emergency Department   Date of Visit:  5/26/2019           Disclosure     Insurance plans vary and the physician(s) referred by the ER may not be covered by your plan.  Please contact you tell this physician (or your personal doctor if your instructions are to return to your personal doctor) about any new or lasting problems. The primary care or specialist physician will see patients referred from the [facility] Emergency Department.  Kathy Reddy

## (undated) NOTE — ED AVS SNAPSHOT
Conrad Almonte   MRN: [id number]    Department:  [address] [facility] in [place]   Date of Visit:  3/31/2018           Disclosure     Insurance plans vary and the physician(s) referred by the ER may not be covered by your plan.  Please contac tell this physician (or your personal doctor if your instructions are to return to your personal doctor) about any new or lasting problems. The primary care or specialist physician will see patients referred from the [facility] Emergency Department.  Ryan Pool

## (undated) NOTE — ED AVS SNAPSHOT
Letitia Jacobs   MRN: [id number]    Department:  [facility] Emergency Department   Date of Visit:  7/8/2019           Disclosure     Insurance plans vary and the physician(s) referred by the ER may not be covered by your plan.  Please contact your tell this physician (or your personal doctor if your instructions are to return to your personal doctor) about any new or lasting problems. The primary care or specialist physician will see patients referred from the [facility] Emergency Department.  Claudio Laureano

## (undated) NOTE — ED AVS SNAPSHOT
Deven Macias   MRN: [id number]    Department:  [facility] Emergency Department   Date of Visit:  8/8/2019           Disclosure     Insurance plans vary and the physician(s) referred by the ER may not be covered by your plan.  Please contact your tell this physician (or your personal doctor if your instructions are to return to your personal doctor) about any new or lasting problems. The primary care or specialist physician will see patients referred from the [facility] Emergency Department.  Lev Beckett

## (undated) NOTE — LETTER
[facility]  Phone: [phone]  Right Fax: [phone]  Radha [address] Jen Quinonez RN Date: 10/8/19    Patient Name: Carmen Lopez  Surgery Date: 10/15/2019    CSN: [id number]  Medical Record: [id number]   :

## (undated) NOTE — ED AVS SNAPSHOT
Luis Gregorio   MRN: [id number]    Department:  [facility] Emergency Department in [place]   Date of Visit:  8/12/2019           Disclosure     Insurance plans vary and the physician(s) referred by the ER may not be covered by your plan.  Please contac tell this physician (or your personal doctor if your instructions are to return to your personal doctor) about any new or lasting problems. The primary care or specialist physician will see patients referred from the [facility] Emergency Department.  Kellie Quintana

## (undated) NOTE — ED AVS SNAPSHOT
Yvette Galvan   MRN: [id number]    Department:  [facility] Emergency Department   Date of Visit:  7/9/2019           Disclosure     Insurance plans vary and the physician(s) referred by the ER may not be covered by your plan.  Please contact your tell this physician (or your personal doctor if your instructions are to return to your personal doctor) about any new or lasting problems. The primary care or specialist physician will see patients referred from the [facility] Emergency Department.  Mauro Cee

## (undated) NOTE — ED AVS SNAPSHOT
Leeanne Steven   MRN: [id number]    Department:  [facility] Emergency Department   Date of Visit:  7/30/2019           Disclosure     Insurance plans vary and the physician(s) referred by the ER may not be covered by your plan.  Please contact you tell this physician (or your personal doctor if your instructions are to return to your personal doctor) about any new or lasting problems. The primary care or specialist physician will see patients referred from the [facility] Emergency Department.  Sally Villela

## (undated) NOTE — IP AVS SNAPSHOT
[address]            (For Outpatient Use Only) Initial Admit Date: 10/15/2019   Inpt/Obs Admit Date: Inpt: 10/15/19 / Obs: N/A   Discharge Date:    Nicolasaeen Captain:  [de-identified]   MRN: [de-identified]   CSN: [id number]   CEID: [id number] Subscriber Name:  Nevaeh  :    Subscriber ID:  Pt Rel to Subscriber:    [place] [facility] [place]